# Patient Record
Sex: MALE | Race: WHITE
[De-identification: names, ages, dates, MRNs, and addresses within clinical notes are randomized per-mention and may not be internally consistent; named-entity substitution may affect disease eponyms.]

---

## 2019-11-29 ENCOUNTER — HOSPITAL ENCOUNTER (EMERGENCY)
Dept: HOSPITAL 97 - ER | Age: 64
Discharge: ANOTHER HOSPITAL | End: 2019-11-29
Payer: COMMERCIAL

## 2019-11-29 VITALS — SYSTOLIC BLOOD PRESSURE: 108 MMHG | DIASTOLIC BLOOD PRESSURE: 71 MMHG

## 2019-11-29 VITALS — TEMPERATURE: 98.2 F

## 2019-11-29 VITALS — OXYGEN SATURATION: 100 %

## 2019-11-29 DIAGNOSIS — I10: ICD-10-CM

## 2019-11-29 DIAGNOSIS — K74.60: ICD-10-CM

## 2019-11-29 DIAGNOSIS — R18.8: ICD-10-CM

## 2019-11-29 DIAGNOSIS — D64.9: ICD-10-CM

## 2019-11-29 DIAGNOSIS — C22.8: Primary | ICD-10-CM

## 2019-11-29 LAB
ALBUMIN SERPL BCP-MCNC: 1.7 G/DL (ref 3.4–5)
ALP SERPL-CCNC: 164 U/L (ref 45–117)
ALT SERPL W P-5'-P-CCNC: 17 U/L (ref 12–78)
AST SERPL W P-5'-P-CCNC: 26 U/L (ref 15–37)
BUN BLD-MCNC: 31 MG/DL (ref 7–18)
GLUCOSE SERPLBLD-MCNC: 190 MG/DL (ref 74–106)
HCT VFR BLD CALC: 17.3 % (ref 39.6–49)
INR BLD: 1.3
LIPASE SERPL-CCNC: 33 U/L (ref 73–393)
LYMPHOCYTES # SPEC AUTO: 0.2 K/UL (ref 0.7–4.9)
PMV BLD: 8.8 FL (ref 7.6–11.3)
POTASSIUM SERPL-SCNC: 4.6 MMOL/L (ref 3.5–5.1)
RBC # BLD: 1.93 M/UL (ref 4.33–5.43)

## 2019-11-29 PROCEDURE — 85610 PROTHROMBIN TIME: CPT

## 2019-11-29 PROCEDURE — 74022 RADEX COMPL AQT ABD SERIES: CPT

## 2019-11-29 PROCEDURE — 80048 BASIC METABOLIC PNL TOTAL CA: CPT

## 2019-11-29 PROCEDURE — 82140 ASSAY OF AMMONIA: CPT

## 2019-11-29 PROCEDURE — 36415 COLL VENOUS BLD VENIPUNCTURE: CPT

## 2019-11-29 PROCEDURE — 83690 ASSAY OF LIPASE: CPT

## 2019-11-29 PROCEDURE — 71045 X-RAY EXAM CHEST 1 VIEW: CPT

## 2019-11-29 PROCEDURE — 99285 EMERGENCY DEPT VISIT HI MDM: CPT

## 2019-11-29 PROCEDURE — 86850 RBC ANTIBODY SCREEN: CPT

## 2019-11-29 PROCEDURE — 30233N1 TRANSFUSION OF NONAUTOLOGOUS RED BLOOD CELLS INTO PERIPHERAL VEIN, PERCUTANEOUS APPROACH: ICD-10-PCS

## 2019-11-29 PROCEDURE — 80076 HEPATIC FUNCTION PANEL: CPT

## 2019-11-29 PROCEDURE — 86901 BLOOD TYPING SEROLOGIC RH(D): CPT

## 2019-11-29 PROCEDURE — 86900 BLOOD TYPING SEROLOGIC ABO: CPT

## 2019-11-29 PROCEDURE — 36430 TRANSFUSION BLD/BLD COMPNT: CPT

## 2019-11-29 PROCEDURE — 85025 COMPLETE CBC W/AUTO DIFF WBC: CPT

## 2019-11-29 PROCEDURE — 96374 THER/PROPH/DIAG INJ IV PUSH: CPT

## 2019-11-29 NOTE — RAD REPORT
EXAM DESCRIPTION:  Charito Single View11/29/2019 6:01 am

 

CLINICAL HISTORY:  sob

 

COMPARISON:  none

 

FINDINGS:   The lungs appear clear of acute infiltrate. The heart is normal size

 

IMPRESSION:   No acute abnormalities displayed

## 2019-11-29 NOTE — ER
Nurse's Notes                                                                                     

 [facility]                                                                 

Name: Ried Ortega                                                                               

Age: 64 yrs                                                                                       

Sex: Male                                                                                         

: 1955                                                                                   

MRN: [id number]                                                                                   

Arrival Date: 2019                                                                          

Time: 03:42                                                                                       

Account#: [id number]                                                                            

Bed 2                                                                                             

Private MD:                                                                                       

Diagnosis: Abdominal pain and distension. Gross ascites. Ca liver. Anemia                         

                                                                                                  

Presentation:                                                                                     

                                                                                             

03:40 Presenting complaint: EMS states: we were called out for the chief complaint swelling   rr5 

      of the abdomen (ascitic), having the discomfort that is getting worse. patient been a       

      known case of liver cirrhosis liver cancer.                                                 

03:40 Transition of care: patient was not received from another setting of care. Onset of     rr5 

      symptoms was 2019. Risk Assessment: Do you want to hurt yourself or            

      someone else? Patient reports no desire to harm self or others. Initial Sepsis Screen:      

      Does the patient meet any 2 criteria? No. Patient's initial sepsis screen is negative.      

      Does the patient have a suspected source of infection? No. Patient's initial sepsis         

      screen is negative. Note had been admitted to [facility] for paracentesis before. but        

      now does get an appointment. patient reported had an on and off diarrhea. Care prior to     

      arrival: Medication(s) given: reglan.                                                       

03:40 Method Of Arrival: EMS: [facility]                                                      rr5 

03:40 Acuity: KERI 3                                                                           rr5 

                                                                                                  

Historical:                                                                                       

- Allergies:                                                                                      

03:50 No Known Allergies;                                                                     rr5 

- Home Meds:                                                                                      

03:50 Nexavar oral oral [Active]; Lactulose Oral [Active]; doxazosin oral oral [Active];      rr5 

      Spironolactone Oral [Active]; enoxaparin subcutaneous subcutaneous [Active]; Zofran         

      Oral [Active]; carvedilol oral oral [Active]; amlodipine oral [Active]; Furosemide Oral     

      [Active]; tramadol Oral [Active]; Zinc Sulfate Oral [Active]; D3 [Active]; Miralax Oral     

      [Active]; Simethicone Oral [Active]; gabapentin oral oral [Active];                         

      Acetaminophen-Codeine Oral [Active]; Methocarbamol Oral [Active]; senocot [Active];         

- PMHx:                                                                                           

03:50 Hypertension; polycystic kidney disease; gallstones; liver cancer; lymph node cancer;   rr5 

      portal and spleenic blood clots;                                                            

- PSHx:                                                                                           

03:50 Appendectomy; removed varicose veins; Knee surgery; Hernia repair; leg repair for       rr5 

      bruising compartment syndrome;                                                              

                                                                                                  

- Immunization history:: Adult Immunizations up to date.                                          

- Social history:: Smoking status: Patient/guardian denies using tobacco,                         

  Patient/guardian denies using alcohol, street drugs.                                            

- Ebola Screening: : Patient negative for fever greater than or equal to 101.5 degrees            

  Fahrenheit, and additional compatible Ebola Virus Disease symptoms Patient denies               

  exposure to infectious person Patient denies travel to an Ebola-affected area in the            

  21 days before illness onset.                                                                   

                                                                                                  

                                                                                                  

Screenin:58 Abuse screen: Denies threats or abuse. Denies injuries from another. Nutritional        rr5 

      screening: No deficits noted. Tuberculosis screening: No symptoms or risk factors           

      identified. Fall Risk IV access (20 points). Gait- Impaired (20 pts.). Total Park Fall     

      Scale indicates Low Risk Score (25-44 pts). Fall prevention measures have been              

      instituted. Side Rails Up X 2 Placed close to Nursing Station Frequent Obs/Assesments       

      occuring Family Present and informed to notify staff if they need to leave bedside As       

      available Patient and Family Educated on Fall Prevention Program and strategies.            

                                                                                                  

Assessment:                                                                                       

03:30 General: Appears in no apparent distress. uncomfortable, Behavior is calm, cooperative, rr5 

      appropriate for age. Pain: Complains of pain in abdomen diffusely Pain radiates to          

      chest Pain currently is 5 out of 10 on a pain scale. Quality of pain is described as        

      aching, Pain began gradually, Is intermittent. Neuro: Level of Consciousness is awake,      

      alert, obeys commands, Oriented to person, place, time, situation, Appropriate for age.     

      Cardiovascular: Capillary refill < 3 seconds Patient's skin is warm and dry.                

      Respiratory: Reports shortness of breath Airway is patent Respiratory effort is even,       

      unlabored, Respiratory pattern is regular, symmetrical. GI: Abdomen is noted to have        

      ascites, Reports bloating, diarrhea. : No signs and/or symptoms were reported             

      regarding the genitourinary system. EENT: Eyes icteric sclera.                              

03:30 Derm: Skin is intact, Skin is pale, Skin temperature is warm. Musculoskeletal:          rr5 

      Capillary refill < 3 seconds, Swelling present in right leg.                                

04:15 Reassessment: Patient appears in no apparent distress at this time. No changes from     rr5 

      previously documented assessment. Patient and/or family updated on plan of care and         

      expected duration. Pain level reassessed. Patient is alert, oriented x 3, equal             

      unlabored respirations, skin warm/dry/pink. awaiting for results.                           

05:10 Reassessment: HGB of 5.5 ED provider aware with order made and carried out. for         rr5 

      transfusion 2 PRBC, consented request sent.                                                 

06:25 Reassessment: Patient appears in no apparent distress at this time. Patient is alert,   rr5 

      oriented x 3, equal unlabored respirations, skin warm/dry/pink. awaiting for the blood      

      product for transfusion. for transfer to [facility] still awaiting for the acceptance.       

      patient is resting eyes closed breathing spontaneously with oxygen support 2 liters via     

      nasal cannula. no complaints made.                                                          

06:52 Reassessment: transfer center of [facility] said to call back after shift change.  rr5 

06:59 Reassessment: blood transfusion started, checked by 2 nurses. primary nurse at bedside. jd3 

07:00 Reassessment: Patient appears in no apparent distress at this time. 1st unit of PRBC    rr5 

      started wristband [id number] unit number [id number] expires 2019. type a             

      positive. double check by 2 nurses.                                                         

07:08 Reassessment: Patient appears in no apparent distress at this time. pt received with      

      blood infusing as ordered, checked off with Ciro BARROS, transfusion continues at this       

      time, pt tolerating well, pt family at bedside, awaiting to call report to receiving        

      facility, pt to be transferred.                                                             

07:41 Reassessment: Patient appears in no apparent distress at this time. report called to    rachel Moses RN. She states that [facility] does not accept patients with active blood              

      transfusion, pt to be transfused then transferred to receiving facility, pt and pt          

      family updated.                                                                             

09:35 Reassessment: Patient appears in no apparent distress at this time. Patient and/or      sg  

      family updated on plan of care and expected duration. Pain level reassessed. Patient is     

      alert, oriented x 3, equal unlabored respirations, skin warm/dry/pink. 2nd unit PRBC        

      infusion completed at this time. Respiratory: Airway is patent Respiratory effort is        

      even, unlabored, Respiratory pattern is regular, symmetrical, Breath sounds are clear.      

09:35 Derm: Skin is intact, Skin is jaundiced, pale, Skin temperature is warm.                sg  

      Musculoskeletal: Capillary refill < 3 seconds, in bilateral fingers. toes. Swelling         

      present in right leg Reports pain in right leg.                                             

                                                                                                  

Vital Signs:                                                                                      

03:45  / 62; Pulse 72; Resp 19; Temp 98.4; Pulse Ox 100% ; Weight 113.4 kg; Height 6    rr5 

      ft. 3 in. (190.50 cm); Pain 5/10;                                                           

04:39  / 81; Pulse 75; Resp 18; Pulse Ox 99% on 2 lpm NC;                               rr5 

05:45  / 71; Pulse 70; Resp 17; Temp 97.5; Pulse Ox 99% on 2 lpm NC;                    rr5 

06:28  / 70; Pulse 76; Resp 18; Temp 97.5; Pulse Ox 99% on 2 lpm NC;                    rr5 

07:10  / 76; Pulse 88; Resp 18; Temp 97.5; Pulse Ox 99% on 2 lpm NC;                    sg  

07:25  / 81; Pulse 79; Resp 16; Temp 97.7; Pulse Ox 100% on 2 lpm NC;                   sg  

08:35  / 58; Pulse 73; Resp 16; Temp 98.2; Pulse Ox 100% on 2 lpm NC;                   sg  

09:05  / 71; Pulse 72; Resp 16; Temp 98.2; Pulse Ox 100% on 2 lpm NC; Pain 1/10;        sg  

09:40  / 70; Pulse 79; Resp 16; Temp 97.9; Pulse Ox 100% on 2 lpm NC; Pain 1/10;        sg  

03:45 Body Mass Index 31.25 (113.40 kg, 190.50 cm)                                            rr5 

                                                                                                  

ED Course:                                                                                        

03:20 Maintain EMS IV. Dressing intact. Good blood return noted. Site clean \T\ dry. Gauge \T\    rr
5

      site: G20 right forearm.                                                                    

03:42 Patient arrived in ED.                                                                  rr5 

03:47 Shiva Nunes MD is Attending Physician.                                                    pkl 

03:48 Triage completed.                                                                       rr5 

03:50 Arm band placed on right wrist.                                                         rr5 

04:28 Ciro Myers RN is Primary Nurse.                                                    rr5 

04:49 Patient has correct armband on for positive identification. Bed in low position. Call   rr5 

      light in reach. Side rails up X2. Pulse ox on. NIBP on.                                     

05:03 Notified ED physician of a critical lab result(s). Hgb of 5.5, Hct of 17.3. Dr Des hernandez  

      notified.                                                                                   

05:50 transfer initiated by [facility] with Deandre from the [facility].     eb  

06:24 connected Dr. Mckeon the hospitalist on call for [facility] with Dr. Nunes for patient eb  

      transfer consultation.                                                                      

06:29 administrative approval given by Triny Lemus  pending a bed. Dr. eli Iniguez has accepted the patient in transfer to [facility].                      

06:39 room assignment given by Triny Lemus. Pt is going to [facility]. Bed 759/      eb  

      report to be called to [phone].                                                        

06:55 Served as a chaperone during rectal exam.                                               rr5 

07:00 Report received from FIORELLA Tanner.                                                       sg  

07:07 Primary Nurse role handed off by Ciro Myers, FIORELLA                                     sg  

07:07 Vince Cuenca, RN is Primary Nurse.                                                       sg  

09:44 Patient transferred, IV remains in place. intact, No redness/swelling at site.          sg  

                                                                                                  

Administered Medications:                                                                         

07:53 Drug: fentaNYL (PF) 50 mcg Route: IVP; Site: right forearm;                             sg  

08:30 Follow up: Response: No adverse reaction; Pain is decreased; RASS: Drowsy (-1)            

                                                                                                  

                                                                                                  

Point of Care Testing:                                                                            

      Guaiac:                                                                                     

06:55 Stool Guaiac: Positive; Stool Hemoccult Control: Pass;                                  rr5 

Outcome:                                                                                          

06:39 ER care complete, transfer ordered by MD.                                               pkl 

09:44 Transferred by ground EMS to [facility], Transfer form completed. Note:     sv  

      Report given to Denise from  EMS                                                         

09:44 Condition: stable                                                                           

09:44 Instructed on the need for transfer.                                                        

09:57 Patient left the ED.                                                                    sg  

                                                                                                  

Signatures:                                                                                       

Kate Gonzalez RN RN sv Gay, Steven, RN                         Shiva Bryan MD MD pkl Ballard, Brenda, RN RN bb Davies, Jonathon, RN RN   jKristy Overton Raymond, RN                      RN   rr5                                                  

                                                                                                  

Corrections: (The following items were deleted from the chart)                                    

06:32 06:28  / 70; Pulse 76bpm; Resp 18bpm; Pulse Ox 99% 2 lpm Nasal Cannula; Temp      rr5 

      96.9F; rr5                                                                                  

                                                                                                  

**************************************************************************************************

## 2019-11-29 NOTE — EDPHYS
Physician Documentation                                                                           

 [facility]                                                                 

Name: Reid Ortega                                                                               

Age: 64 yrs                                                                                       

Sex: Male                                                                                         

: 1955                                                                                   

MRN: [id number]                                                                                   

Arrival Date: 2019                                                                          

Time: 03:42                                                                                       

Account#: [id number]                                                                            

Bed 2                                                                                             

Private MD:                                                                                       

ED Physician Shiva Nunes                                                                             

HPI:                                                                                              

                                                                                             

04:11 This 64 yrs old  Male presents to ER via EMS with complaints of abdominal      pkl 

      discomfort.                                                                                 

04:11 The patient presents with abdominal pain abdominal distention difficulty breathing.     pkl 

      Onset: The symptoms/episode began/occurred today. Patient has H/O liver cirrhosis and       

      cancer. Nad radiation therapy and now on chemotherapy.                                      

                                                                                                  

Historical:                                                                                       

- Allergies:                                                                                      

03:50 No Known Allergies;                                                                     rr5 

- Home Meds:                                                                                      

03:50 Nexavar oral oral [Active]; Lactulose Oral [Active]; doxazosin oral oral [Active];      rr5 

      Spironolactone Oral [Active]; enoxaparin subcutaneous subcutaneous [Active]; Zofran         

      Oral [Active]; carvedilol oral oral [Active]; amlodipine oral [Active]; Furosemide Oral     

      [Active]; tramadol Oral [Active]; Zinc Sulfate Oral [Active]; D3 [Active]; Miralax Oral     

      [Active]; Simethicone Oral [Active]; gabapentin oral oral [Active];                         

      Acetaminophen-Codeine Oral [Active]; Methocarbamol Oral [Active]; senocot [Active];         

- PMHx:                                                                                           

03:50 Hypertension; polycystic kidney disease; gallstones; liver cancer; lymph node cancer;   rr5 

      portal and spleenic blood clots;                                                            

- PSHx:                                                                                           

03:50 Appendectomy; removed varicose veins; Knee surgery; Hernia repair; leg repair for       rr5 

      bruising compartment syndrome;                                                              

                                                                                                  

- Immunization history:: Adult Immunizations up to date.                                          

- Social history:: Smoking status: Patient/guardian denies using tobacco,                         

  Patient/guardian denies using alcohol, street drugs.                                            

- Ebola Screening: : Patient negative for fever greater than or equal to 101.5 degrees            

  Fahrenheit, and additional compatible Ebola Virus Disease symptoms Patient denies               

  exposure to infectious person Patient denies travel to an Ebola-affected area in the            

  21 days before illness onset.                                                                   

                                                                                                  

                                                                                                  

ROS:                                                                                              

04:11 Eyes: Negative for injury, pain, redness, and discharge, ENT: Negative for injury,      pkl 

      pain, and discharge, Neck: Negative for injury, pain, and swelling, Cardiovascular:         

      Negative for chest pain, palpitations, and edema.                                           

04:11 Respiratory: Positive for shortness of breath, at rest.                                     

04:11 Abdomen/GI: Positive for abdominal pain, abdominal distension.                              

04:11 Back: Negative for acute changes.                                                           

04:11 : Negative for urinary symptoms.                                                          

04:11 MS/extremity: Positive for swelling, of the both  legs.                                     

04:11 Skin: Negative for rash.                                                                    

04:11 Neuro: Negative for altered mental status.                                                  

                                                                                                  

Exam:                                                                                             

04:11 Head/Face:  Normocephalic, atraumatic. Eyes:  Pupils equal round and reactive to light, pkl 

      extra-ocular motions intact.  Lids and lashes normal.  Conjunctiva and sclera are           

      non-icteric and not injected.  Cornea within normal limits.  Periorbital areas with no      

      swelling, redness, or edema. ENT:  Nares patent. No nasal discharge, no septal              

      abnormalities noted.  Tympanic membranes are normal and external auditory canals are        

      clear.  Oropharynx with no redness, swelling, or masses, exudates, or evidence of           

      obstruction, uvula midline.  Mucous membranes moist. Neck:  Trachea midline, no             

      thyromegaly or masses palpated, and no cervical lymphadenopathy.  Supple, full range of     

      motion without nuchal rigidity, or vertebral point tenderness.  No Meningismus.             

      Chest/axilla:  Normal chest wall appearance and motion.  Nontender with no deformity.       

      No lesions are appreciated. Cardiovascular:  Regular rate and rhythm with a normal S1       

      and S2.  No gallops, murmurs, or rubs.  Normal PMI, no JVD.  No pulse deficits.             

      Respiratory:  Lungs have equal breath sounds bilaterally, clear to auscultation and         

      percussion.  No rales, rhonchi or wheezes noted.  No increased work of breathing, no        

      retractions or nasal flaring.                                                               

04:11 Abdomen/GI: Inspection: distension, that is moderate.                                       

04:11 Back: Exam negative for acute changes.                                                      

04:11 : Exam negative for                                                                       

04:11 Musculoskeletal/extremity: Extremities: grossly normal except: noted in the both  legs:     

      swelling.                                                                                   

04:11 Skin: Exam negative for rash.                                                               

04:11 Neuro: Orientation: is normal, Mentation: is normal, Cranial nerves: grossly normal,        

      Motor: is normal.                                                                           

                                                                                                  

Vital Signs:                                                                                      

03:45  / 62; Pulse 72; Resp 19; Temp 98.4; Pulse Ox 100% ; Weight 113.4 kg; Height 6    rr5 

      ft. 3 in. (190.50 cm); Pain 5/10;                                                           

04:39  / 81; Pulse 75; Resp 18; Pulse Ox 99% on 2 lpm NC;                               rr5 

05:45  / 71; Pulse 70; Resp 17; Temp 97.5; Pulse Ox 99% on 2 lpm NC;                    rr5 

06:28  / 70; Pulse 76; Resp 18; Temp 97.5; Pulse Ox 99% on 2 lpm NC;                    rr5 

07:10  / 76; Pulse 88; Resp 18; Temp 97.5; Pulse Ox 99% on 2 lpm NC;                    sg  

07:25  / 81; Pulse 79; Resp 16; Temp 97.7; Pulse Ox 100% on 2 lpm NC;                   sg  

08:35  / 58; Pulse 73; Resp 16; Temp 98.2; Pulse Ox 100% on 2 lpm NC;                   sg  

09:05  / 71; Pulse 72; Resp 16; Temp 98.2; Pulse Ox 100% on 2 lpm NC; Pain 1/10;        sg  

09:40  / 70; Pulse 79; Resp 16; Temp 97.9; Pulse Ox 100% on 2 lpm NC; Pain /10;        sg  

03:45 Body Mass Index 31.25 (113.40 kg, 190.50 cm)                                            rr5 

                                                                                                  

MDM:                                                                                              

03:47 Patient medically screened.                                                             pkl 

06:35 Data reviewed: vital signs, nurses notes, lab test result(s), radiologic studies, plain pkl 

      films.                                                                                      

06:52 ED course: Talked to Dr. Mckeon. Transfer to [facility] under Dr. Iniguez service.  pkl 

                                                                                                  

                                                                                             

04:11 Order name: Basic Metabolic Panel                                                       pkl 

                                                                                             

04:11 Order name: CBC with Diff                                                               pkl 

                                                                                             

04:11 Order name: Creatinine for Radiology                                                    pkl 

                                                                                             

04:11 Order name: Hepatic Function                                                            pkl 

                                                                                             

04:11 Order name: Lipase                                                                      pkl 

                                                                                             

04:11 Order name: AMMONIA                                                                     pkl 

                                                                                             

04:55 Order name: Creatinine (Radiology Only); Complete Time: 05:06                           [facility]

                                                                                             

04:57 Order name: Basic Metabolic Panel; Complete Time: 05:06                                 [facility]

                                                                                             

04:57 Order name: Liver (Hepatic) Function; Complete Time: 05:06                              [facility]

                                                                                             

04:57 Order name: Lipase; Complete Time: 05:06                                                [facility]

                                                                                             

04:58 Order name: Ammonia; Complete Time: 05:06                                               [facility]

                                                                                             

05:01 Order name: CBC with Automated Diff; Complete Time: 05:06                               [facility]

                                                                                             

05:04 Order name: Type And Screen                                                               

                                                                                             

05:05 Order name: PRBC                                                                          

                                                                                             

04:11 Order name: IV Saline Lock; Complete Time: 04:28                                        [facility] 

                                                                                             

04:11 Order name: Labs collected and sent; Complete Time: 04:28                               [facility] 

                                                                                             

04:11 Order name: XRAY Abdomen Acute Series                                                   [facility] 

                                                                                             

05:05 Order name: Transfuse; Complete Time: 07:03                                               

                                                                                             

05:44 Order name: XRAY CXR (1 view)                                                           [facility] 

                                                                                             

05:59 Order name: ABO/RH no charge; Complete Time: 06:23                                      [facility]

                                                                                             

06:28 Order name: ABO/RH typing                                                               [facility]

                                                                                             

06:28 Order name: Antibody Screen                                                             [facility]

                                                                                             

06:38 Order name: PT-INR                                                                        

                                                                                             

07:09 Order name: Protime (+INR)                                                              [facility]

                                                                                             

08:51 Order name: RAD                                                                         [facility]

                                                                                             

09:05 Order name: RAD                                                                         [facility]

                                                                                                  

Administered Medications:                                                                         

07:53 Drug: fentaNYL (PF) 50 mcg Route: IVP; Site: right forearm;                             sg  

08:30 Follow up: Response: No adverse reaction; Pain is decreased; RASS: Drowsy (-1)          sg  

                                                                                                  

                                                                                                  

Point of Care Testing:                                                                            

      Guaiac:                                                                                     

06:55 Stool Guaiac: Positive; Stool Hemoccult Control: Pass;                                  rr5 

Disposition:                                                                                      

19 06:39 Transfer ordered to [facility]. Diagnosis is Abdominal pain    

  and distension. Gross ascites. Ca liver. Anemia.                                                

- Reason for transfer: Higher level of care.                                                      

- Accepting physician is Dr. Iniguez.                                                            

- Condition is Stable.                                                                            

- Problem is new.                                                                                 

- Symptoms are unchanged.                                                                         

                                                                                                  

                                                                                                  

                                                                                                  

Signatures:                                                                                       

Dispatcher [facility]                                                 

Vince Cuenca RN                         RN   sg                                                   

Shiva Nunes MD MD   pkJoselin Velásquez RN                     RN   david Myers, Ciro, RN                      RN   rr5                                                  

                                                                                                  

Corrections: (The following items were deleted from the chart)                                    

09:57 06:39 2019 06:39 Transfer ordered to [facility]. Diagnosis is sg  

      Abdominal pain and distension. Gross ascites. Ca liver. Anemia. Reason for transfer:        

      Higher level of care. Accepting physician is Dr. Iniguez. Condition is Stable. Problem     

      is new. Symptoms are unchanged. pkl                                                         

                                                                                                  

**************************************************************************************************

## 2019-11-29 NOTE — RAD REPORT
EXAM DESCRIPTION:

RAD - Abdomen Acute Series - 11/29/2019 5:27 am

 

CLINICAL HISTORY:  Abdominal pain

 

FINDINGS:  Examination is suboptimal secondary to a combination of technique and body habitus.

 

The bowel gas pattern appears unremarkable

 

Free air is not visualized

## 2019-12-06 ENCOUNTER — HOSPITAL ENCOUNTER (INPATIENT)
Dept: HOSPITAL 97 - 5TH | Age: 64
LOS: 18 days | Discharge: HOME | DRG: 432 | End: 2019-12-24
Attending: PSYCHIATRY & NEUROLOGY | Admitting: PSYCHIATRY & NEUROLOGY
Payer: COMMERCIAL

## 2019-12-06 VITALS — BODY MASS INDEX: 31.9 KG/M2

## 2019-12-06 DIAGNOSIS — R18.8: ICD-10-CM

## 2019-12-06 DIAGNOSIS — N18.9: ICD-10-CM

## 2019-12-06 DIAGNOSIS — F99: ICD-10-CM

## 2019-12-06 DIAGNOSIS — I48.92: ICD-10-CM

## 2019-12-06 DIAGNOSIS — I12.9: ICD-10-CM

## 2019-12-06 DIAGNOSIS — R01.1: ICD-10-CM

## 2019-12-06 DIAGNOSIS — Q61.3: ICD-10-CM

## 2019-12-06 DIAGNOSIS — G04.30: ICD-10-CM

## 2019-12-06 DIAGNOSIS — I49.9: ICD-10-CM

## 2019-12-06 DIAGNOSIS — I48.91: ICD-10-CM

## 2019-12-06 DIAGNOSIS — B19.20: ICD-10-CM

## 2019-12-06 DIAGNOSIS — R53.81: ICD-10-CM

## 2019-12-06 DIAGNOSIS — C22.0: ICD-10-CM

## 2019-12-06 DIAGNOSIS — K74.69: Primary | ICD-10-CM

## 2019-12-06 DIAGNOSIS — D64.89: ICD-10-CM

## 2019-12-06 PROCEDURE — 74018 RADEX ABDOMEN 1 VIEW: CPT

## 2019-12-06 PROCEDURE — 87088 URINE BACTERIA CULTURE: CPT

## 2019-12-06 PROCEDURE — 97161 PT EVAL LOW COMPLEX 20 MIN: CPT

## 2019-12-06 PROCEDURE — 97110 THERAPEUTIC EXERCISES: CPT

## 2019-12-06 PROCEDURE — 87086 URINE CULTURE/COLONY COUNT: CPT

## 2019-12-06 PROCEDURE — 97542 WHEELCHAIR MNGMENT TRAINING: CPT

## 2019-12-06 PROCEDURE — 97530 THERAPEUTIC ACTIVITIES: CPT

## 2019-12-06 PROCEDURE — 97116 GAIT TRAINING THERAPY: CPT

## 2019-12-06 PROCEDURE — 80048 BASIC METABOLIC PNL TOTAL CA: CPT

## 2019-12-06 PROCEDURE — 82140 ASSAY OF AMMONIA: CPT

## 2019-12-06 PROCEDURE — 81001 URINALYSIS AUTO W/SCOPE: CPT

## 2019-12-06 PROCEDURE — 36415 COLL VENOUS BLD VENIPUNCTURE: CPT

## 2019-12-06 PROCEDURE — 84134 ASSAY OF PREALBUMIN: CPT

## 2019-12-06 PROCEDURE — 85025 COMPLETE CBC W/AUTO DIFF WBC: CPT

## 2019-12-06 PROCEDURE — 83735 ASSAY OF MAGNESIUM: CPT

## 2019-12-06 PROCEDURE — 82040 ASSAY OF SERUM ALBUMIN: CPT

## 2019-12-11 RX ADMIN — OXYCODONE HYDROCHLORIDE PRN MG: 5 TABLET ORAL at 23:21

## 2019-12-11 NOTE — XMS REPORT
Summary of Care

 Created on:2019



Patient:Remington Ortega

Sex:Male

:1955

External Reference #:[id number]





Demographics







 Address   



   [place] [address]

 

 Home Phone  [phone]

 

 Mobile Phone  [phone]

 

 Email Address  [email or web address]

 

 Preferred Language  English

 

 Marital Status  

 

 Worship Affiliation  Unknown

 

 Race  White

 

 Ethnic Group  Not  or 









Author







 Organization  [facility]

 

 Address  [address] [place] [address]









Support







 Name  Relationship  Address  Phone

 

 Santa Delaney  Unavailable     [phone]



     [place] [address]  









Care Team Providers







 Name  Role  Phone

 

 Maykel Scott MD  Primary Care Provider  [phone]









Encounter Details







 Date  Type  Department  Care Team  Description

 

 2019  Orders Only  [facility]



  Doctor Unassigned, No  



     [address] [facility]



  Name



  



     [place] [address]  [address] [place] [address]  







Allergies

No Known Allergiesdocumented as of this encounter (statuses as of 2019)



Medications







 Medication  Sig  Dispensed  Refills  Start Date  End Date  Status

 

 ELIQUIS 5 mg tablet  TAKE 1 TABLET BY    1  2017    Active



   MOUTH TWICE A          



   DAY          

 

 HYDROcodone-acetaminophe  Take 1 tablet by  120 tablet  0  2017    Active



 n  mg tablet  mouth every 6          



   (six) hours as          



   needed for Pain          



   (scale 4-6).          

 

 furosemide 20 mg tablet  Take 1 tablet by  90 tablet  3  2018    Active



   mouth daily.          

 

 chlorthalidone 25 mg  Take 1 tablet by  90 tablet  3  2018    Active



 tablet  mouth daily.          

 

 methylPREDNISolone  Take  by mouth  21 Each  0  10/03/2018    Active



 (MEDROL, JANELLE,) 4 mg  SEE-INSTRUCTIONS          



 tabletsIndications:  . follow package          



 Acute gout due to renal  directions          



 impairment involving            



 left ankle            

 

 amLODIPine 10 mg tablet  Take 1 tablet by  90 tablet  3  2019    Active



   mouth daily.          

 

 olmesartan 40 mg tablet  Take 1 tablet by  30 tablet  5  2019    Active



   mouth daily.          

 

 pantoprazole 40 mg EC  Take 1 tablet by  30 tablet  5  2019    Active



 tabletIndications:  mouth daily.          



 Gastroesophageal reflux            



 disease, esophagitis            



 presence not specified            

 

 carvedilol 25 mg tablet  Take 2 tablets  120 tablet  1  2019    Active



   by mouth 2 (two)          



   times daily with          



   meals.          

 

 TRAMADOL 50 mg  TAKE ONE (1)  120 tablet  2  2019    Active



 tabletIndications: Pain  TABLET(S) BY          



   MOUTH EVERY SIX          



   HOURS AS NEEDED          



   FOR PAIN.          



documented as of this encounter (statuses as of 2019)



Active Problems







 Problem  Noted Date

 

 Chronic hepatitis C  2016

 

 Gout  2016

 

 Diabetes  2016

 

 Essential hypertension  2016



documented as of this encounter (statuses as of 2019)



Immunizations







 Name  Administration Dates  Next Due

 

 HEP B, Adult Dosage  2017, 2017, 2017  

 

 Influenza Virus Vaccine Quad ID [id number] [id number]  10/22/2015  

 

 Influenza Virus Vaccine Quad IM  2018  



 Multi-dose 6+ MO    

 

 Pneumococcal 13 Conjugate, PCV13 (Prevnar  2017  



 13)    

 

 Zoster(Zostavax)(Shingles)  2017  



documented as of this encounter



Social History







 Tobacco Use  Types  Packs/Day  Years Used  Date

 

 Never Smoker        









 Smokeless Tobacco: Never Used      









 Alcohol Use  Drinks/Week  oz/Week  Comments

 

 No      









 Sex Assigned at Birth  Date Recorded

 

 Not on file  









 Job Start Date  Occupation  Industry

 

 Not on file  Not on file  Not on file









 Travel History  Travel Start  Travel End









 No recent travel history available.



documented as of this encounter



Last Filed Vital Signs

Not on filedocumented in this encounter



Plan of Treatment







 Health Maintenance  Due Date  Last Done  Comments

 

 HEPATITIS C (HCV) SCREEN  1955    

 

 EYE EXAM  1966    

 

 LDL-C  1966    

 

 URINE MICROALBUMIN  1966    

 

 FOOT EXAM  1974    

 

 DTaP,Tdap,and Td Vaccines (1 - Tdap)  1975    

 

 COLONOSCOPY  2006    

 

 PNEUMOCOCCAL 0-64 YEARS COMBINED SERIES (1 of 1 -  2017  



 PPSV23)      

 

 Zoster Recombinant Vaccine (SHINGRIX) (2 of 3)  2017  

 

 HgA1C  2017  

 

 CREATININE (SERUM)  2018  

 

 INFLUENZA VACCINE (#1)  2019  



documented as of this encounter



Procedures







 Procedure Name  Priority  Date/Time  Associated Diagnosis  Comments

 

 NO SHOW OR MISSED  Routine  2019  8:40 AM    



 APPOINTMENT POLICY    CDT    



 ACKNOWLEDGEMENT        



documented in this encounter



Results

Not on filedocumented in this encounter



Insurance







 Payer  Benefit Plan  Subscriber ID  Effective  Phone  Address  Type



   / Group    Dates      

 

 AETNA -  AETNA  [id number]  2019-Prese    [address]  Medicare Adv



 MANAGED  MEDICARE ADV    nt    [id number]



  [place]IC[place]  



           [id number]  



documented as of this encounter

## 2019-12-11 NOTE — XMS REPORT
Summary of Care

 Created on:2019



Patient:Remington Ortega

Sex:Male

:1955

External Reference #:[id number]





Demographics







 Address   



   [place] [address]

 

 Home Phone  [phone]

 

 Mobile Phone  [phone]

 

 Email Address  [email or web address]

 

 Preferred Language  English

 

 Marital Status  

 

 Jainism Affiliation  Unknown

 

 Race  White

 

 Ethnic Group  Not  or 









Author







 Organization  [facility]

 

 Address  [address] [place] [address]









Support







 Name  Relationship  Address  Phone

 

 Santa Delaney  Unavailable     [phone]



     [place] [address]  









Care Team Providers







 Name  Role  Phone

 

 Maykel Scott MD  Primary Care Provider  [phone]









Reason for Visit







 Reason  Comments

 

 Follow-up  

 

 Diabetes Mellitus II  

 

 Hypertension  

 

 Refill Request  







Encounter Details







 Date  Type  Department  Care Team  Description

 

 2019  Office Visit  [facility]  Maykel Scott,  Essential 
hypertension (Primary Dx);



     Medicine - [facility]



  Type 2 diabetes mellitus with stage 3 chronic kidney disease, without long-
term current use of insulin;



     [address]  Malignant neoplasm of liver, unspecified 
liver malignancy type;



     Drive



  DRIVE



  Gastroesophageal reflux disease, esophagitis presence not specified;



     [place], [place]  Pain



     [id number] [phone]  



       (Fax)  







Allergies

No Known Allergiesdocumented as of this encounter (statuses as of 2019)



Medications







 Medication  Sig  Dispensed  Refills  Start  End Date  Status



         Date    

 

 HYDROcodone-acetaminop  Take 1 tablet  120 tablet  0      Active



 hen  mg tablet  by mouth every      7    



   6 (six) hours          



   as needed for          



   Pain (scale          



   4-6).          

 

 methylPREDNISolone  Take  by mouth  21 Each  0  10/03/201    Active



 (MEDROL, JANELLE,) 4 mg  SEE-INSTRUCTIO      8    



 tabletsIndications:  NS. follow          



 Acute gout due to  package          



 renal impairment  directions          



 involving left ankle            

 

 amLODIPine 10 mg  Take 1 tablet  90 tablet  3      Active



 tablet  by mouth      9    



   daily.          

 

 SORAfenib 200 mg  Take 1 tab by    0  20  Active



 tablet  mouth twice      9  20  



   daily for 2          



   weeks. Then 1          



   tab by mouth          



   three times          



   daily for 2          



   weeks.          

 

 traMADol 50 mg  TAKE ONE (1)  120 tablet  2      Active



 tabletIndications:  TABLET(S) BY      9    



 Pain  MOUTH EVERY          



   SIX HOURS AS          



   NEEDED FOR          



   PAIN.          

 

 pantoprazole 40 mg EC  Take 1 tablet  30 tablet  5      Active



 tabletIndications:  by mouth      9    



 Gastroesophageal  daily.          



 reflux disease,            



 esophagitis presence            



 not specified            

 

 olmesartan 40 mg  Take 1 tablet  30 tablet  5      Active



 tabletIndications:  by mouth      9    



 Essential hypertension  daily.          

 

 carvedilol 25 mg  Take 2 tablets  120 tablet  1      Active



 tabletIndications:  by mouth 2      9    



 Essential hypertension  (two) times          



   daily with          



   meals.          

 

 apixaban (ELIQUIS) 2.5  Take 1 tablet  60 tablet  2      Active



 mg tabletIndications:  by mouth 2      9    



 Essential hypertension  (two) times          



   daily.          

 

 ELIQUIS 5 mg tablet  TAKE 1 TABLET    1  20  Discontinued



   BY MOUTH TWICE      7  19  



   A DAY          

 

 furosemide 20 mg  Take 1 tablet  90 tablet  3  20  
Discontinued



 tablet  by mouth      8  19  



   daily.          

 

 chlorthalidone 25 mg  Take 1 tablet  90 tablet  3  20  
Discontinued



 tablet  by mouth      8  19  



   daily.          

 

 olmesartan 40 mg  Take 1 tablet  30 tablet  5  20  
Discontinued



 tablet  by mouth      9  19  



   daily.          

 

 pantoprazole 40 mg EC  Take 1 tablet  30 tablet  5  20  
Discontinued



 tabletIndications:  by mouth      9  19  



 Gastroesophageal  daily.          



 reflux disease,            



 esophagitis presence            



 not specified            

 

 carvedilol 25 mg  Take 2 tablets  120 tablet  1  20  
Discontinued



 tablet  by mouth 2      9  19  



   (two) times          



   daily with          



   meals.          

 

 TRAMADOL 50 mg  TAKE ONE (1)  120 tablet  2  20  Discontinued



 tabletIndications:  TABLET(S) BY      9  19  



 Pain  MOUTH EVERY          



   SIX HOURS AS          



   NEEDED FOR          



   PAIN.          



documented as of this encounter (statuses as of 2019)



Active Problems







 Problem  Noted Date

 

 Chronic hepatitis C  2016

 

 Gout  2016

 

 Diabetes  2016

 

 Essential hypertension  2016



documented as of this encounter (statuses as of 2019)



Immunizations







 Name  Administration Dates  Next Due

 

 HEP B, Adult Dosage  2017, 2017, 2017  

 

 Influenza Virus Vaccine Quad ID [id number] [id number]  10/22/2015  

 

 Influenza Virus Vaccine Quad IM  2018  



 Multi-dose 6+ MO    

 

 Pneumococcal 13 Conjugate, PCV13 (Prevnar  2017  



 13)    

 

 Zoster(Zostavax)(Shingles)  2017  



documented as of this encounter



Social History







 Tobacco Use  Types  Packs/Day  Years Used  Date

 

 Never Smoker        









 Smokeless Tobacco: Never Used      









 Alcohol Use  Drinks/Week  oz/Week  Comments

 

 No      









 Sex Assigned at Birth  Date Recorded

 

 Not on file  









 Job Start Date  Occupation  Industry

 

 Not on file  Not on file  Not on file









 Travel History  Travel Start  Travel End









 No recent travel history available.



documented as of this encounter



Last Filed Vital Signs







 Vital Sign  Reading  Time Taken  Comments

 

 Blood Pressure  130/90  2019  8:57 AM CDT  

 

 Pulse  -  -  

 

 Temperature  -  -  

 

 Respiratory Rate  -  -  

 

 Oxygen Saturation  -  -  

 

 Inhaled Oxygen Concentration  -  -  

 

 Weight  128.4 kg (283 lb)  2019  8:57 AM CDT  

 

 Height  -  -  

 

 Body Mass Index  34.45  2016  1:33 PM CST  



documented in this encounter



Progress Notes

Maykel Scott MD - 2019  8:30 AM CDTFormatting of this note might be 
different from the original.

Cc: HTN, DMII, Liver cancer

Chief Complaint

Patient presents with

 Follow-up

 Diabetes Mellitus II

 Hypertension

 Refill Request



Remington Ortega is a 64 year old male.

Here for f/u



Allergies

Remington has No Known Allergies.



Medications

Outpatient Medications Prior to Visit

Medication Sig Dispense Refill

 SORAfenib 200 mg tablet Take 1 tab by mouth twice daily for 2 weeks. Then 1 
tab by mouth three times daily for 2 weeks.

 TRAMADOL 50 mg tablet TAKE ONE (1) TABLET(S) BY MOUTH EVERY SIX HOURS AS 
NEEDED FOR PAIN. 120 tablet 2

 carvedilol 25 mg tablet Take 2 tablets by mouth 2 (two) times daily with 
meals. 120 tablet 1

 pantoprazole 40 mg EC tablet Take 1 tablet by mouth daily. 30 tablet 5

 amLODIPine 10 mg tablet Take 1 tablet by mouth daily. 90 tablet 3

 olmesartan 40 mg tablet Take 1 tablet by mouth daily. 30 tablet 5

 chlorthalidone 25 mg tablet Take 1 tablet by mouth daily. 90 tablet 3

 furosemide 20 mg tablet Take 1 tablet by mouth daily. 90 tablet 3

 HYDROcodone-acetaminophen  mg tablet Take 1 tablet by mouth every 6 (
six) hours as needed for Pain (scale 4-6). 120 tablet 0

 ELIQUIS 5 mg tablet TAKE 1 TABLET BY MOUTH TWICE A DAY  1

 methylPREDNISolone (MEDROL, JANELLE,) 4 mg tablets Take  by mouth SEE-
INSTRUCTIONS. follow package directions 21 Each 0



No facility-administered medications prior to visit.



Histories

Past Medical History:

Diagnosis Date

 Cancer of liver

 Diabetes mellitus

 Gout

 Hepatitis C

 Hepatitis C, chronic

 says that hep c is gone

 Hypertension

 Polycystic kidney disease



Past Surgical History:

Procedure Laterality Date

 HERNIA REPAIR



Social History



Socioeconomic History

 Marital status: 

  Spouse name: Not on file

 Number of children: Not on file

 Years of education: Not on file

 Highest education level: Not on file

Occupational History

 Not on file

Social Needs

 Financial resource strain: Not on file

 Food insecurity:

  Worry: Not on file

  Inability: Not on file

 Transportation needs:

  Medical: Not on file

  Non-medical: Not on file

Tobacco Use

 Smoking status: Never Smoker

 Smokeless tobacco: Never Used

Substance and Sexual Activity

 Alcohol use: No

 Drug use: No

 Sexual activity: Not on file

Lifestyle

 Physical activity:

  Days per week: Not on file

  Minutes per session: Not on file

 Stress: Not on file

Relationships

 Social connections:

  Talks on phone: Not on file

  Gets together: Not on file

  Attends Evangelical service: Not on file

  Active member of club or organization: Not on file

  Attends meetings of clubs or organizations: Not on file

  Relationship status: Not on file

 Intimate partner violence:

  Fear of current or ex partner: Not on file

  Emotionally abused: Not on file

  Physically abused: Not on file

  Forced sexual activity: Not on file

Other Topics Concern

 Not on file

Social History Narrative

 [place] 



Family History

Problem Relation Age of Onset

 Pulmonary Mother



Review of Systems



Vital Signs

/90  | Wt 283 lb (128.4 kg)  | BMI 34.45 kg/m



Physical Exam

Constitutional: He is oriented to person, place, and time. He appears well-
developed and well-nourished.

HENT:

Head: Normocephalic and atraumatic.

Right Ear: External ear normal.

Left Ear: External ear normal.

Eyes: Pupils are equal, round, and reactive to light. EOM are normal.

Cardiovascular: Normal rate, regular rhythm and normal heart sounds.

Pulmonary/Chest: Effort normal.

Abdominal: Soft.

Musculoskeletal: Normal range of motion.

Neurological: He is alert and oriented to person, place, and time.

Skin: Skin is warm.

Vitals reviewed.



Assessment/Plan

DMII, medication refill

HTN, medication refill

Pain, medication refill



This visit did not involve counseling and coordination that comprised more than 
50% of the visit time.Electronically signed by Maykel Scott MD at 2019  9:04 AM CDTdocumented in this encounter



Plan of Treatment







 Health Maintenance  Due Date  Last Done  Comments

 

 HEPATITIS C (HCV) SCREEN  1955    

 

 EYE EXAM  1966    

 

 LDL-C  1966    

 

 URINE MICROALBUMIN  1966    

 

 FOOT EXAM  1974    

 

 DTaP,Tdap,and Td Vaccines (1 - Tdap)  1975    

 

 COLONOSCOPY  2006    

 

 PNEUMOCOCCAL 0-64 YEARS COMBINED SERIES (1 of 1 -  2017  



 PPSV23)      

 

 Zoster Recombinant Vaccine (SHINGRIX) (2 of 3)  2017  

 

 HgA1C  2017  

 

 CREATININE (SERUM)  2018  

 

 INFLUENZA VACCINE (#1)  2019  



documented as of this encounter



Results

Not on filedocumented in this encounter



Visit Diagnoses







 Diagnosis

 

 Essential hypertension - Primary







 Unspecified essential hypertension

 

 Type 2 diabetes mellitus with stage 3 chronic kidney disease, without long-term



 current use of insulin

 

 Malignant neoplasm of liver, unspecified liver malignancy type

 

 Gastroesophageal reflux disease, esophagitis presence not specified

 

 Pain







 Generalized pain



documented in this encounter



Insurance







 Payer  Benefit Plan  Subscriber ID  Effective  Phone  Address  Type



   / Group    Dates      

 

 AETNA -  AETNA  [id number]  2019-Prese    [address]  Medicare Adv



 MANAGED  MEDICARE ADV    nt    [id number]



  PPO



 MEDICARE          [place]  



           [id number]  









 Guarantor Name  Account Type  Relation to  Date of Birth  Phone  Billing



     Patient      Address

 

 Remington Ortega  Personal/Family  Self  1955  [phone]  [address] 







         (Home)  [place]



           [address]



documented as of this encounter

## 2019-12-11 NOTE — XMS REPORT
Patient Summary Document

 Created on:2019



Patient:RAJ LUU

Sex:Male

:1955

External Reference #:[id number]





Demographics







 Address  [address]



   [place] [address]

 

 Home Phone  [phone]

 

 Preferred Language  Unknown

 

 Marital Status  Unknown

 

 Amish Affiliation  Unknown

 

 Race  Unknown

 

 Additional Race(s)  Unavailable

 

 Ethnic Group  Unknown









Author







 Organization  [facility]

 

 Address  [address] [place] [address] [address]. [address] [place] [address]

 

 Phone  [phone]









Care Team Providers







 Name  Role  Phone

 

 Unavailable  Unavailable  Unavailable









Problems

This patient has no known problems.



Allergies, Adverse Reactions, Alerts

This patient has no known allergies or adverse reactions.



Medications

This patient has no known medications.

## 2019-12-11 NOTE — R.PREADM
SCREENING DATE AND TIME



2019 10:51 (CST) 



ANTICIPATED REHAB ADMISSION DATE



2019 



REFERRING FACILITY



[facility] 



REFERRAL DATE AND TIME



2019 10:51 (CST) 



ACUTE ADMIT DATE



2019 





Previous Rehabilitation(s):





No.



ACUTE /DC PLANNER



Kendy Bassett 



REFERRING PHYSICIAN



DR WHYTE 



REHAB FACILITY



[facility] 



CLINICAL LIAISON



Bere Fowler 



PHYSICIAN REVIEWER



Dr. Fareed Nagel M.D. 



MR#



[id number] 



ACCT#



[id number] 



NAME



REMINGTON ORTEGA



ADDRESS



[address] [place] [address] 



PHONE



[phone] 



ZIP



[id number] 



DATE OF BIRTH



1955 



AGE



64 



SSN#



[id number] 



GENDER



male 



MARITAL STATUS



 



RACE



white 



ADMIT FROM



02 - [facility] 



PRE-HOSPITAL LIVING SETTING



01 - Home (private home/apt. board/care, assisted living, group home, transitional living) 



HOME TYPE AND DETAILS



Type of home: single family house

# of steps to enter the residence: 1

# of levels in the residence: 1

# of steps within the residence: 0



PRE-HOSPITAL LIVING WITH



Family/Relatives 



FAMILY SUPPORT



Yes 



PRIMARY FAMILY CONTACT NAME



Santa Delaney 



PRIMARY FAMILY CONTACT PHONE



[phone] 



PRIMARY FAMILY CONTACT RELATIONSHIP



Spouse 



IS PRIMARY FAMILY CONTACT AUTH. REP.?



no 



1ST EMERGENCY CONTACT



Santa Delaney 



1ST CONTACT PHONE



[phone] 



1ST CONTACT RELATIONSHIP



Spouse 



IS 1ST CONTACT AUTH. REP.?



no 



PHONE 2ND CONTACT ON ADM.?



no 



PATIENT EMPLOYMENT STATUS



Retired (for age) 



PATIENT EMPLOYER



No Employer 



PAYOR INFORMATION:



1ST PAYOR NAME



Gildardo 



1ST PAYOR POLICY ID



[id number] 



INJURY/ILLNESS DUE TO ACCIDENT?



No 



ANOTHER PARTY RESPONSIBLE?



No 



PRIMARY REHAB/ACUTE DIAGNOSIS:



Decompensated Liver Cirrhosis 



ONSET DATE



2019



REHAB IMPAIRMENT CATEGORY (MIRNA):



20 Miscellaneous (Misc) does NOT meet 60% rule



PRIMARY DIAGNOSIS-RELATED SURGERIES:



No surgeries related to the primary diagnosis were performed.



COMORBID REHAB/ACUTE DIAGNOSES:



- Non-Tiered

Cardiac arrhythmia, unspecified (I49.9) 

Ascites

Atrial fibrillation and flutter (I48) 

Chronic Kidney Disease

Edema

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 

Gallstone

Hepatocellular Carcinoma

Heart murmur

Hepatitis C

Hypertension

Jaundice

Mental health Disorder

Numbness

Polycystic Kidney Disease

Rectal Bleeding

Symptomatic anemia



INTERVENTIONS:



- Hypertension

Fluid management



Medications

VS



RISK FOR COMPLICATIONS:



- Hypertension

CVA

Hypotension

MI

TIA



SUMMARY OF ACUTE HOSPITALIZATION:



Pt. is a 65 yo Right-handed white male.

On 2019 he was admitted to [facility] [place] [facility] with diagnosis Decompensated Li
roger Cirrhosis .

His impairment category is Debility 16 -  Debility (16).

Pre-morbidly, Pt. was independent/mod-I in Transfers Control, Safety Awareness, Locomotion, Balance, 
Social Cognition, Sphincter Control, Self-Care, and Endurance; and he had good Safety Awareness.

Currently, he has deficits of Locomotion, Balance, Transfers Control, Self-Care, and Endurance.

Pt. is now referred to [facility] for acute in-patient rehabilitation in order
 to maximize patient's functional independence in activities of daily living, strength, ROM, and mobi
lity.

Patient has realistic goal of being discharged at assistance level 6-Victorina to reside at Home with  Fam
kayden/Relatives.

Remington Ortega

is a 64 year old male that lives with her wife in a 1 chi home. He was independent

with ADLs and self care. On 2019, patient has worsening abdominal

distention, pain and fatigue and having dark, melanotic stools and was admitted

to [facility] and treated.

He is now medically stable but in need of 24-hour nursing, doctor supervision

and oversite while receiving active and ongoing intensive



reasonably expected to participate in 3hours of therapy a day/15 hours per week

and receive care with an intensive interdisciplinary approach.



PAST MEDICAL HISTORY



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 

Ascites

Atrial fibrillation and flutter (I48) 

Cardiac arrhythmia, unspecified (I49.9) 

Chronic Kidney Disease

Edema

Gallstone

Heart murmur

Hepatitis C

Hepatocellular Carcinoma

Hypertension

Jaundice

Mental health Disorder

Numbness

Polycystic Kidney Disease

Rectal Bleeding

Symptomatic anemia



PAST SURGICAL HISTORY:



APPENDECTOMY

Cardiac Catheterization

EGD

Inguinal hernia repair

Cholecystectomy

Total knee bilateral replacement

US upper GI tract, endoscopic



MEDICATION ALLERGIES:



No Known Drug Allergies (NKDA)



ENVIRONMENTAL ALLERGIES:





Shellfish

- Substance Allergies

None Known

- Other Allergies

None Known



CODE STATUS:



Full code



WEIGHT/HEIGHT/BMI:



WEIGHT



252 lbs 



HEIGHT



6' 3" 



BMI



31.5 



DIET:



- Diet Type

Regular

- Diet - Solid Texture

Regular

- Diet - Liquid Texture

Regular

- Tube Feed

N/A



REVIEW OF SYSTEMS:



- Gen

Alert and awake

Lying in bed

No apparent distress

Oriented to: person, time, and place

- Vital Signs

Temperature: 97.7 F

SBP/DBP: 120/67

Pulse: 73

Resp: 19

Vital signs stable, afebrile

- CVS

RRR



VITAL SIGNS



Temperature: 97.7 F

SBP/DBP: 120/67

Pulse: 73

Resp: 19

Vital signs stable, afebrile



MEDICATIONS/TREATMENT:



Other-  See attached MAR (Medication Administration Record).



CURRENT SPHINCTER CONTROL:



Pre-hospital bladder status: continent

# of bladder accidents in the last 7 days prior to screenin

Pre-hospital bowel status: continent

# of bowel accidents in the last 7 days prior to screenin

Last Bowel Movement Date:



CURRENT LOCOMOTION STATUS:



distance walked 50  feet



DETAILED CURRENT FUNCTIONAL STATUS:



- Bladder

accident frequency: Ind - No accidents in the past 7 days

- Bowel

accident frequency: Ind - No accidents in the past 7 days

- Walking

score based on distance walked: 0(N/A)

- Wheelchair

score based on distance traveled: 0(N/A)



QI SCORES:



- Self-Care

A. Eating  04-Supervision or touching assistance  

B. Oral hygiene  04-Supervision or touching assistance  

C. Toileting hygiene  03-Partial/moderate assistance  

E. Shower/bathe self  03-Partial/moderate assistance  

F. Upper body dressing  03-Partial/moderate assistance  

G. Lower body dressing  03-Partial/moderate assistance  

H. Putting on/taking off footwear  03-Partial/moderate assistance  

- Mobility

A. Roll left and right  03-Partial/moderate assistance  

B. Sit to lying  03-Partial/moderate assistance  

C. Lying to sitting on side of bed  88-Not attempted due to medical condition or safety concerns  

D. Sit to stand  03-Partial/moderate assistance  

E. Chair/bed-to-chair transfer  03-Partial/moderate assistance  

F. Toilet transfer  03-Partial/moderate assistance  

G. Car transfer  88-Not attempted due to medical condition or safety concerns  

I. Walk 10 feet  03-Partial/moderate assistance  

J. Walk 50 feet with two turns  88-Not attempted due to medical condition or safety concerns  

K. Walk 150 feet  88-Not attempted due to medical condition or safety concerns  

L. Walking 10 feet on uneven surfaces  88-Not attempted due to medical condition or safety concerns  


M. 1 step (curb)  88-Not attempted due to medical condition or safety concerns  

N. 4 steps  88-Not attempted due to medical condition or safety concerns  

O. 12 steps  88-Not attempted due to medical condition or safety concerns  

P. Picking up object  88-Not attempted due to medical condition or safety concerns  

R. Wheel 50 feet with two turns  88-Not attempted due to medical condition or safety concerns  

S. Wheel 150 feet  88-Not attempted due to medical condition or safety concerns  

- Bladder and Bowel

Bladder continence  0-Always continent  

Bowel continence  0-Always continent  

- Endurance

Fair

- Balance

Fair

- Safety Awareness

Fair



CURRENT FUNC. DEFICITS:



Self-Care, Mobility, Endurance, Balance, and Safety Awareness



CURRENT / PREVIOUS ASSISTIVE DEVICES:



3-in-1 Commode

BSC

[facility]

Hospital Bed

Raised Toilet

Rolling Walker

Shower Chair

Tub Bench

Wheelchair



CURRENT USE ASSISTIVE DEVICES:



SCDs

TEDs



HISTORY OF FALLS. HAS THE PATIENT HAD TWO OR MORE FALLS IN THE PAST YEAR OR ANY FALL WITH INJURY IN T
HE PAST YEAR?:



No 



PRIOR SURGERY. DID THE PATIENT HAVE MAJOR SURGERY DURING  DAYS PRIOR TO ADMISSION?:



No 



THERAPY NOTES FROM ACUTE CARE:



Attached.



SPECIAL NEEDS:



- Safety Concerns

Skin breakdown precautions needed due to skin breakdown risk



PATIENT NEEDS ACTIVE AND ONGOING THERAPEUTIC INTERVENTION OF MULTIPLE THERAPY DISCIPLINES, INCLUDING:




- Dietary and Nutrition

Adequate Nutrition. Nutritional Education. Nutritional Supplements. 



PATIENT NEEDS CLOSE MEDICAL SUPERVISION BY A REHABILITATION PHYSICIAN FOR:



Coordination of Treatment Team

Medical and Co-Morbidity Management



PATIENT REQUIRES 24X7 REHAB NURSING FOR MEDICAL AND FUNCTIONAL MGT. OF THE FOLLOWING DEFICITS:



Disease Management

Medication Management

Patient/Family Education

Providing Safe Environment



PATIENT REQUIRES INTENSIVE, COORDINATED INTERDISCIPLINARY APPROACH TO REHAB:



Arranging Home Equipment/Services

Discharge Planning

Family Intervention/Training

/Case Management



PATIENT REHAB POTENTIAL:



AVA ORTEGA is able and expected to receive 3 hours of individualized therapy daily on at least 5 of e
very 7 days

AVA ORTEGA's prognosis for significant practical improvement within a reasonable period of time appea
rs Good

Expected level of measurable improvement will be of a practical value to AVA ORTEGA's functional capa
city or adaptations to impairments

Has a viable Discharge Plan

Medically appropriate; condition is sufficiently stable to participate in intensive rehab program



DISCHARGE PLAN:



- Estimated Length of Stay (days)

13. 



- Consensus on plan

Discharge plan has been discussed with primary caregiver. Patient/Family is in agreement with the jose
n. Primary caregiver is in agreement with the plan. 



- Patient/Family Goals

Return home independently. 



- Planned Living Setting Upon Discharge

Home, to live with Family/Relatives. Transitional Living. 



RECOMMENDED CARE LEVEL:



IRF



RECOMMENDATION DETAILS:



Recommended Admission to Comprehensive Rehabilitation Program to Increase Functional [place]



SCREENER'S COMPLETENESS CONFIRMATION:



- Screening Confirmation

The patient data collection on this preadmission screening form is finished



PHYSICIANS REVIEW AND ADMISSION DETERMINATION



Admit - Based on my review of the Pre-Admission Screening results, in my medical judgment and experie
nce, I concur with the findings and recommend admission to [facility], as this
 patient requires an IRF level of care.



SIGNATURE PANEL:



Clinical Liaison - [electronically] signed by Bere Allen on 2019 at 15:56 (CST)

Physician Reviewer - [electronically] signed by Dr. Fareed Nagel M.D. on 2019 at 16:03 (CST
)

## 2019-12-11 NOTE — XMS REPORT
Summary of Care

 Created on:2019



Patient:Remington Ortega

Sex:Male

:1955

External Reference #:[id number]





Demographics







 Address   



   [place] [address]

 

 Home Phone  [phone]

 

 Mobile Phone  [phone]

 

 Email Address  [email or web address]

 

 Preferred Language  English

 

 Marital Status  

 

 Restoration Affiliation  Unknown

 

 Race  White

 

 Ethnic Group  Not  or 









Author







 Organization  [facility]

 

 Address  [address] [place] [address]









Support







 Name  Relationship  Address  Phone

 

 Santa Delaney  Unavailable     [phone]



     [place] [address]  









Care Team Providers







 Name  Role  Phone

 

 Maykel Scott MD  Primary Care Provider  [phone]









Reason for Visit







 Reason  Comments

 

 Follow-up  

 

 Diabetes Mellitus II  

 

 Hypertension  

 

 Refill Request  







Encounter Details







 Date  Type  Department  Care Team  Description

 

 2019  Office Visit  [facility]  Maykel Scott,  Essential 
hypertension (Primary Dx);



     Medicine - [facility]



  Type 2 diabetes mellitus with stage 3 chronic kidney disease, without long-
term current use of insulin;



     [address]  Malignant neoplasm of liver, unspecified 
liver malignancy type;



     Drive



  DRIVE



  Gastroesophageal reflux disease, esophagitis presence not specified;



     [place], [place]  [place]



     [id number] [phone]  



       (Fax)  







Allergies

No Known Allergiesdocumented as of this encounter (statuses as of 2019)



Medications







 Medication  Sig  Dispensed  Refills  Start  End Date  Status



         Date    

 

 HYDROcodone-acetaminop  Take 1 tablet  120 tablet  0      Active



 hen  mg tablet  by mouth every      7    



   6 (six) hours          



   as needed for          



   Pain (scale          



   4-6).          

 

 methylPREDNISolone  Take  by mouth  21 Each  0  10/03/201    Active



 (MEDROL, JANELLE,) 4 mg  SEE-INSTRUCTIO      8    



 tabletsIndications:  NS. follow          



 Acute gout due to  package          



 renal impairment  directions          



 involving left ankle            

 

 amLODIPine 10 mg  Take 1 tablet  90 tablet  3      Active



 tablet  by mouth      9    



   daily.          

 

 SORAfenib 200 mg  Take 1 tab by    0  20  Active



 tablet  mouth twice      9  20  



   daily for 2          



   weeks. Then 1          



   tab by mouth          



   three times          



   daily for 2          



   weeks.          

 

 traMADol 50 mg  TAKE ONE (1)  120 tablet  2      Active



 tabletIndications:  TABLET(S) BY      9    



 Pain  MOUTH EVERY          



   SIX HOURS AS          



   NEEDED FOR          



   PAIN.          

 

 pantoprazole 40 mg EC  Take 1 tablet  30 tablet  5      Active



 tabletIndications:  by mouth      9    



 Gastroesophageal  daily.          



 reflux disease,            



 esophagitis presence            



 not specified            

 

 olmesartan 40 mg  Take 1 tablet  30 tablet  5      Active



 tabletIndications:  by mouth      9    



 Essential hypertension  daily.          

 

 carvedilol 25 mg  Take 2 tablets  120 tablet  1      Active



 tabletIndications:  by mouth 2      9    



 Essential hypertension  (two) times          



   daily with          



   meals.          

 

 apixaban (ELIQUIS) 2.5  Take 1 tablet  60 tablet  2      Active



 mg tabletIndications:  by mouth 2      9    



 Essential hypertension  (two) times          



   daily.          

 

 ELIQUIS 5 mg tablet  TAKE 1 TABLET    1  20  Discontinued



   BY MOUTH TWICE      7  19  



   A DAY          

 

 furosemide 20 mg  Take 1 tablet  90 tablet  3  20  
Discontinued



 tablet  by mouth      8  19  



   daily.          

 

 chlorthalidone 25 mg  Take 1 tablet  90 tablet  3  20  
Discontinued



 tablet  by mouth      8  19  



   daily.          

 

 olmesartan 40 mg  Take 1 tablet  30 tablet  5  20  
Discontinued



 tablet  by mouth      9  19  



   daily.          

 

 pantoprazole 40 mg EC  Take 1 tablet  30 tablet  5  20  
Discontinued



 tabletIndications:  by mouth      9  19  



 Gastroesophageal  daily.          



 reflux disease,            



 esophagitis presence            



 not specified            

 

 carvedilol 25 mg  Take 2 tablets  120 tablet  1  20  
Discontinued



 tablet  by mouth 2      9  19  



   (two) times          



   daily with          



   meals.          

 

 TRAMADOL 50 mg  TAKE ONE (1)  120 tablet  2  20  Discontinued



 tabletIndications:  TABLET(S) BY      9  19  



 Pain  MOUTH EVERY          



   SIX HOURS AS          



   NEEDED FOR          



   PAIN.          



documented as of this encounter (statuses as of 2019)



Active Problems







 Problem  Noted Date

 

 Chronic hepatitis C  2016

 

 Gout  2016

 

 Diabetes  2016

 

 Essential hypertension  2016



documented as of this encounter (statuses as of 2019)



Immunizations







 Name  Administration Dates  Next Due

 

 HEP B, Adult Dosage  2017, 2017, 2017  

 

 Influenza Virus Vaccine Quad ID [id number]  10/22/2015  

 

 Influenza Virus Vaccine Quad IM  2018  



 Multi-dose 6+ MO    

 

 Pneumococcal 13 Conjugate, PCV13 (Prevnar  2017  



 13)    

 

 Zoster(Zostavax)(Shingles)  2017  



documented as of this encounter



Social History







 Tobacco Use  Types  Packs/Day  Years Used  Date

 

 Never Smoker        









 Smokeless Tobacco: Never Used      









 Alcohol Use  Drinks/Week  oz/Week  Comments

 

 No      









 Sex Assigned at Birth  Date Recorded

 

 Not on file  









 Job Start Date  Occupation  Industry

 

 Not on file  Not on file  Not on file









 Travel History  Travel Start  Travel End









 No recent travel history available.



documented as of this encounter



Last Filed Vital Signs







 Vital Sign  Reading  Time Taken  Comments

 

 Blood Pressure  130/90  2019  8:57 AM CDT  

 

 Pulse  -  -  

 

 Temperature  -  -  

 

 Respiratory Rate  -  -  

 

 Oxygen Saturation  -  -  

 

 Inhaled Oxygen Concentration  -  -  

 

 Weight  128.4 kg (283 lb)  2019  8:57 AM CDT  

 

 Height  -  -  

 

 Body Mass Index  34.45  2016  1:33 PM CST  



documented in this encounter



Progress Notes

Maykel Scott MD - 2019  8:30 AM CDTFormatting of this note might be 
different from the original.

Cc: HTN, DMII, Liver cancer

Chief Complaint

Patient presents with

 Follow-up

 Diabetes Mellitus II

 Hypertension

 Refill Request



Remington Ortega is a 64 year old male.

Here for f/u



Allergies

Remington has No Known Allergies.



Medications

Outpatient Medications Prior to Visit

Medication Sig Dispense Refill

 SORAfenib 200 mg tablet Take 1 tab by mouth twice daily for 2 weeks. Then 1 
tab by mouth three times daily for 2 weeks.

 TRAMADOL 50 mg tablet TAKE ONE (1) TABLET(S) BY MOUTH EVERY SIX HOURS AS 
NEEDED FOR PAIN. 120 tablet 2

 carvedilol 25 mg tablet Take 2 tablets by mouth 2 (two) times daily with 
meals. 120 tablet 1

 pantoprazole 40 mg EC tablet Take 1 tablet by mouth daily. 30 tablet 5

 amLODIPine 10 mg tablet Take 1 tablet by mouth daily. 90 tablet 3

 olmesartan 40 mg tablet Take 1 tablet by mouth daily. 30 tablet 5

 chlorthalidone 25 mg tablet Take 1 tablet by mouth daily. 90 tablet 3

 furosemide 20 mg tablet Take 1 tablet by mouth daily. 90 tablet 3

 HYDROcodone-acetaminophen  mg tablet Take 1 tablet by mouth every 6 (
six) hours as needed for Pain (scale 4-6). 120 tablet 0

 ELIQUIS 5 mg tablet TAKE 1 TABLET BY MOUTH TWICE A DAY  1

 methylPREDNISolone (MEDROL, JANELLE,) 4 mg tablets Take  by mouth SEE-
INSTRUCTIONS. follow package directions 21 Each 0



No facility-administered medications prior to visit.



Histories

Past Medical History:

Diagnosis Date

 Cancer of liver

 Diabetes mellitus

 Gout

 Hepatitis C

 Hepatitis C, chronic

 says that hep c is gone

 Hypertension

 Polycystic kidney disease



Past Surgical History:

Procedure Laterality Date

 HERNIA REPAIR



Social History



Socioeconomic History

 Marital status: 

  Spouse name: Not on file

 Number of children: Not on file

 Years of education: Not on file

 Highest education level: Not on file

Occupational History

 Not on file

Social Needs

 Financial resource strain: Not on file

 Food insecurity:

  Worry: Not on file

  Inability: Not on file

 Transportation needs:

  Medical: Not on file

  Non-medical: Not on file

Tobacco Use

 Smoking status: Never Smoker

 Smokeless tobacco: Never Used

Substance and Sexual Activity

 Alcohol use: No

 Drug use: No

 Sexual activity: Not on file

Lifestyle

 Physical activity:

  Days per week: Not on file

  Minutes per session: Not on file

 Stress: Not on file

Relationships

 Social connections:

  Talks on phone: Not on file

  Gets together: Not on file

  Attends Holiness service: Not on file

  Active member of club or organization: Not on file

  Attends meetings of clubs or organizations: Not on file

  Relationship status: Not on file

 Intimate partner violence:

  Fear of current or ex partner: Not on file

  Emotionally abused: Not on file

  Physically abused: Not on file

  Forced sexual activity: Not on file

Other Topics Concern

 Not on file

Social History Narrative

 [place] 



Family History

Problem Relation Age of Onset

 Pulmonary Mother



Review of Systems



Vital Signs

/90  | Wt 283 lb (128.4 kg)  | BMI 34.45 kg/m



Physical Exam

Constitutional: He is oriented to person, place, and time. He appears well-
developed and well-nourished.

HENT:

Head: Normocephalic and atraumatic.

Right Ear: External ear normal.

Left Ear: External ear normal.

Eyes: Pupils are equal, round, and reactive to light. EOM are normal.

Cardiovascular: Normal rate, regular rhythm and normal heart sounds.

Pulmonary/Chest: Effort normal.

Abdominal: Soft.

Musculoskeletal: Normal range of motion.

Neurological: He is alert and oriented to person, place, and time.

Skin: Skin is warm.

Vitals reviewed.



Assessment/Plan

DMII, medication refill

HTN, medication refill

Pain, medication refill



This visit did not involve counseling and coordination that comprised more than 
50% of the visit time.Electronically signed by Maykel Scott MD at 2019  9:04 AM CDTdocumented in this encounter



Plan of Treatment







 Health Maintenance  Due Date  Last Done  Comments

 

 HEPATITIS C (HCV) SCREEN  1955    

 

 EYE EXAM  1966    

 

 LDL-C  1966    

 

 URINE MICROALBUMIN  1966    

 

 FOOT EXAM  1974    

 

 DTaP,Tdap,and Td Vaccines (1 - Tdap)  1975    

 

 COLONOSCOPY  2006    

 

 PNEUMOCOCCAL 0-64 YEARS COMBINED SERIES (1 of 1 -  2017  



 PPSV23)      

 

 Zoster Recombinant Vaccine (SHINGRIX) (2 of 3)  2017  

 

 HgA1C  2017  

 

 CREATININE (SERUM)  2018  

 

 INFLUENZA VACCINE (#1)  2019  



documented as of this encounter



Results

Not on filedocumented in this encounter



Visit Diagnoses







 Diagnosis

 

 Essential hypertension - Primary







 Unspecified essential hypertension

 

 Type 2 diabetes mellitus with stage 3 chronic kidney disease, without long-term



 current use of insulin

 

 Malignant neoplasm of liver, unspecified liver malignancy type

 

 Gastroesophageal reflux disease, esophagitis presence not specified

 

 Pain







 Generalized pain



documented in this encounter



Insurance







 Payer  Benefit Plan  Subscriber ID  Effective  Phone  Address  Type



   / Group    Dates      

 

 [employer] -  [employer]  [id number]  2019-Prese    [address]  Medicare Adv



 MANAGED  MEDICARE ADV    nt    [id number]



  PPO



 MEDICARE          [place]  



           [id number]  









 Guarantor Name  Account Type  Relation to  Date of Birth  Phone  Billing



     Patient      Address

 

 Remington Ortega  Personal/Family  Self  1955  [phone]  [address] 







         (Home)  [place]



           [id number]



documented as of this encounter

## 2019-12-11 NOTE — XMS REPORT
Summary of Care

 Created on:2019



Patient:Remington Ortega

Sex:Male

:1955

External Reference #:[id number]





Demographics







 Address   



   [place] [address]

 

 Home Phone  [phone]

 

 Mobile Phone  [phone]

 

 Email Address  [email or web address]

 

 Preferred Language  English

 

 Marital Status  

 

 Shinto Affiliation  Unknown

 

 Race  White

 

 Ethnic Group  Not  or 









Author







 Organization  [facility]

 

 Address  [address] [place] [id number]









Support







 Name  Relationship  Address  Phone

 

 Santa Delaney  Unavailable     [phone]



     [place] [address]  









Care Team Providers







 Name  Role  Phone

 

 Maykel Scott MD  Primary Care Provider  [phone]









Reason for Visit







 Reason  Comments

 

 Follow-up  

 

 Diabetes Mellitus II  

 

 Hypertension  

 

 Refill Request  







Encounter Details







 Date  Type  Department  Care Team  Description

 

 2019  Office Visit  [facility]  Maykel Scott,  Essential 
hypertension (Primary Dx);



     Medicine - [facility]



  Type 2 diabetes mellitus with stage 3 chronic kidney disease, without long-
term current use of insulin;



     [address]  Malignant neoplasm of liver, unspecified 
liver malignancy type;



     Drive



  DRIVE



  Gastroesophageal reflux disease, esophagitis presence not specified;



     [place], [place]  [place]



     [id number] [phone]  



       (Fax)  







Allergies

No Known Allergiesdocumented as of this encounter (statuses as of 2019)



Medications







 Medication  Sig  Dispensed  Refills  Start  End Date  Status



         Date    

 

 HYDROcodone-acetaminop  Take 1 tablet  120 tablet  0      Active



 hen  mg tablet  by mouth every      7    



   6 (six) hours          



   as needed for          



   Pain (scale          



   4-6).          

 

 methylPREDNISolone  Take  by mouth  21 Each  0  10/03/201    Active



 (MEDROL, JANELLE,) 4 mg  SEE-INSTRUCTIO      8    



 tabletsIndications:  NS. follow          



 Acute gout due to  package          



 renal impairment  directions          



 involving left ankle            

 

 amLODIPine 10 mg  Take 1 tablet  90 tablet  3      Active



 tablet  by mouth      9    



   daily.          

 

 SORAfenib 200 mg  Take 1 tab by    0  20  Active



 tablet  mouth twice      9  20  



   daily for 2          



   weeks. Then 1          



   tab by mouth          



   three times          



   daily for 2          



   weeks.          

 

 traMADol 50 mg  TAKE ONE (1)  120 tablet  2      Active



 tabletIndications:  TABLET(S) BY      9    



 Pain  MOUTH EVERY          



   SIX HOURS AS          



   NEEDED FOR          



   PAIN.          

 

 pantoprazole 40 mg EC  Take 1 tablet  30 tablet  5      Active



 tabletIndications:  by mouth      9    



 Gastroesophageal  daily.          



 reflux disease,            



 esophagitis presence            



 not specified            

 

 olmesartan 40 mg  Take 1 tablet  30 tablet  5      Active



 tabletIndications:  by mouth      9    



 Essential hypertension  daily.          

 

 carvedilol 25 mg  Take 2 tablets  120 tablet  1      Active



 tabletIndications:  by mouth 2      9    



 Essential hypertension  (two) times          



   daily with          



   meals.          

 

 apixaban (ELIQUIS) 2.5  Take 1 tablet  60 tablet  2      Active



 mg tabletIndications:  by mouth 2      9    



 Essential hypertension  (two) times          



   daily.          

 

 ELIQUIS 5 mg tablet  TAKE 1 TABLET    1  20  Discontinued



   BY MOUTH TWICE      7  19  



   A DAY          

 

 furosemide 20 mg  Take 1 tablet  90 tablet  3  20  
Discontinued



 tablet  by mouth      8  19  



   daily.          

 

 chlorthalidone 25 mg  Take 1 tablet  90 tablet  3  20  
Discontinued



 tablet  by mouth      8  19  



   daily.          

 

 olmesartan 40 mg  Take 1 tablet  30 tablet  5  20  
Discontinued



 tablet  by mouth      9  19  



   daily.          

 

 pantoprazole 40 mg EC  Take 1 tablet  30 tablet  5  20  
Discontinued



 tabletIndications:  by mouth      9  19  



 Gastroesophageal  daily.          



 reflux disease,            



 esophagitis presence            



 not specified            

 

 carvedilol 25 mg  Take 2 tablets  120 tablet  1  20  
Discontinued



 tablet  by mouth 2      9  19  



   (two) times          



   daily with          



   meals.          

 

 TRAMADOL 50 mg  TAKE ONE (1)  120 tablet  2  20  Discontinued



 tabletIndications:  TABLET(S) BY      9  19  



 Pain  MOUTH EVERY          



   SIX HOURS AS          



   NEEDED FOR          



   PAIN.          



documented as of this encounter (statuses as of 2019)



Active Problems







 Problem  Noted Date

 

 Chronic hepatitis C  2016

 

 Gout  2016

 

 Diabetes  2016

 

 Essential hypertension  2016



documented as of this encounter (statuses as of 2019)



Immunizations







 Name  Administration Dates  Next Due

 

 HEP B, Adult Dosage  2017, 2017, 2017  

 

 Influenza Virus Vaccine Quad ID [id number]  10/22/2015  

 

 Influenza Virus Vaccine Quad IM  2018  



 Multi-dose 6+ MO    

 

 Pneumococcal 13 Conjugate, PCV13 (Prevnar  2017  



 13)    

 

 Zoster(Zostavax)(Shingles)  2017  



documented as of this encounter



Social History







 Tobacco Use  Types  Packs/Day  Years Used  Date

 

 Never Smoker        









 Smokeless Tobacco: Never Used      









 Alcohol Use  Drinks/Week  oz/Week  Comments

 

 No      









 Sex Assigned at Birth  Date Recorded

 

 Not on file  









 Job Start Date  Occupation  Industry

 

 Not on file  Not on file  Not on file









 Travel History  Travel Start  Travel End









 No recent travel history available.



documented as of this encounter



Last Filed Vital Signs







 Vital Sign  Reading  Time Taken  Comments

 

 Blood Pressure  130/90  2019  8:57 AM CDT  

 

 Pulse  -  -  

 

 Temperature  -  -  

 

 Respiratory Rate  -  -  

 

 Oxygen Saturation  -  -  

 

 Inhaled Oxygen Concentration  -  -  

 

 Weight  128.4 kg (283 lb)  2019  8:57 AM CDT  

 

 Height  -  -  

 

 Body Mass Index  34.45  2016  1:33 PM CST  



documented in this encounter



Progress Notes

Maykel Scott MD - 2019  8:30 AM CDTFormatting of this note might be 
different from the original.

Cc: HTN, DMII, Liver cancer

Chief Complaint

Patient presents with

 Follow-up

 Diabetes Mellitus II

 Hypertension

 Refill Request



Remington Ortega is a 64 year old male.

Here for f/u



Allergies

Remington has No Known Allergies.



Medications

Outpatient Medications Prior to Visit

Medication Sig Dispense Refill

 SORAfenib 200 mg tablet Take 1 tab by mouth twice daily for 2 weeks. Then 1 
tab by mouth three times daily for 2 weeks.

 TRAMADOL 50 mg tablet TAKE ONE (1) TABLET(S) BY MOUTH EVERY SIX HOURS AS 
NEEDED FOR PAIN. 120 tablet 2

 carvedilol 25 mg tablet Take 2 tablets by mouth 2 (two) times daily with 
meals. 120 tablet 1

 pantoprazole 40 mg EC tablet Take 1 tablet by mouth daily. 30 tablet 5

 amLODIPine 10 mg tablet Take 1 tablet by mouth daily. 90 tablet 3

 olmesartan 40 mg tablet Take 1 tablet by mouth daily. 30 tablet 5

 chlorthalidone 25 mg tablet Take 1 tablet by mouth daily. 90 tablet 3

 furosemide 20 mg tablet Take 1 tablet by mouth daily. 90 tablet 3

 HYDROcodone-acetaminophen  mg tablet Take 1 tablet by mouth every 6 (
six) hours as needed for Pain (scale 4-6). 120 tablet 0

 ELIQUIS 5 mg tablet TAKE 1 TABLET BY MOUTH TWICE A DAY  1

 methylPREDNISolone (MEDROL, JANLELE,) 4 mg tablets Take  by mouth SEE-
INSTRUCTIONS. follow package directions 21 Each 0



No facility-administered medications prior to visit.



Histories

Past Medical History:

Diagnosis Date

 Cancer of liver

 Diabetes mellitus

 Gout

 Hepatitis C

 Hepatitis C, chronic

 says that hep c is gone

 Hypertension

 Polycystic kidney disease



Past Surgical History:

Procedure Laterality Date

 HERNIA REPAIR



Social History



Socioeconomic History

 Marital status: 

  Spouse name: Not on file

 Number of children: Not on file

 Years of education: Not on file

 Highest education level: Not on file

Occupational History

 Not on file

Social Needs

 Financial resource strain: Not on file

 Food insecurity:

  Worry: Not on file

  Inability: Not on file

 Transportation needs:

  Medical: Not on file

  Non-medical: Not on file

Tobacco Use

 Smoking status: Never Smoker

 Smokeless tobacco: Never Used

Substance and Sexual Activity

 Alcohol use: No

 Drug use: No

 Sexual activity: Not on file

Lifestyle

 Physical activity:

  Days per week: Not on file

  Minutes per session: Not on file

 Stress: Not on file

Relationships

 Social connections:

  Talks on phone: Not on file

  Gets together: Not on file

  Attends Jainism service: Not on file

  Active member of club or organization: Not on file

  Attends meetings of clubs or organizations: Not on file

  Relationship status: Not on file

 Intimate partner violence:

  Fear of current or ex partner: Not on file

  Emotionally abused: Not on file

  Physically abused: Not on file

  Forced sexual activity: Not on file

Other Topics Concern

 Not on file

Social History Narrative

 [place] 



Family History

Problem Relation Age of Onset

 Pulmonary Mother



Review of Systems



Vital Signs

/90  | Wt 283 lb (128.4 kg)  | BMI 34.45 kg/m



Physical Exam

Constitutional: He is oriented to person, place, and time. He appears well-
developed and well-nourished.

HENT:

Head: Normocephalic and atraumatic.

Right Ear: External ear normal.

Left Ear: External ear normal.

Eyes: Pupils are equal, round, and reactive to light. EOM are normal.

Cardiovascular: Normal rate, regular rhythm and normal heart sounds.

Pulmonary/Chest: Effort normal.

Abdominal: Soft.

Musculoskeletal: Normal range of motion.

Neurological: He is alert and oriented to person, place, and time.

Skin: Skin is warm.

Vitals reviewed.



Assessment/Plan

DMII, medication refill

HTN, medication refill

Pain, medication refill



This visit did not involve counseling and coordination that comprised more than 
50% of the visit time.Electronically signed by Maykel Scott MD at 2019  9:04 AM CDTdocumented in this encounter



Plan of Treatment







 Health Maintenance  Due Date  Last Done  Comments

 

 HEPATITIS C (HCV) SCREEN  1955    

 

 EYE EXAM  1966    

 

 LDL-C  1966    

 

 URINE MICROALBUMIN  1966    

 

 FOOT EXAM  1974    

 

 DTaP,Tdap,and Td Vaccines (1 - Tdap)  1975    

 

 COLONOSCOPY  2006    

 

 PNEUMOCOCCAL 0-64 YEARS COMBINED SERIES (1 of 1 -  2017  



 PPSV23)      

 

 Zoster Recombinant Vaccine (SHINGRIX) (2 of 3)  2017  

 

 HgA1C  2017  

 

 CREATININE (SERUM)  2018  

 

 INFLUENZA VACCINE (#1)  2019  



documented as of this encounter



Results

Not on filedocumented in this encounter



Visit Diagnoses







 Diagnosis

 

 Essential hypertension - Primary







 Unspecified essential hypertension

 

 Type 2 diabetes mellitus with stage 3 chronic kidney disease, without long-term



 current use of insulin

 

 Malignant neoplasm of liver, unspecified liver malignancy type

 

 Gastroesophageal reflux disease, esophagitis presence not specified

 

 Pain







 Generalized pain



documented in this encounter



Insurance







 Payer  Benefit Plan  Subscriber ID  Effective  Phone  Address  Type



   / Group    Dates      

 

 [employer] -  [employer]  [id number]  2019-Prese    [address]  Medicare Adv



 MANAGED  MEDICARE ADV    nt    [id number]



  PPO



 MEDICARE          [place]  



           [phone]  









 Guarantor Name  Account Type  Relation to  Date of Birth  Phone  Billing



     Patient      Address

 

 Remington Ortega  Personal/Family  Self  1955  [phone]  [address] 







         (Home)  [place]



           [id number]



documented as of this encounter

## 2019-12-12 LAB
ALBUMIN SERPL BCP-MCNC: 3.3 G/DL (ref 3.4–5)
ANISOCYTOSIS BLD QL: (no result)
BLD SMEAR INTERP: (no result)
BUN BLD-MCNC: 17 MG/DL (ref 7–18)
GLUCOSE SERPLBLD-MCNC: 144 MG/DL (ref 74–106)
HCT VFR BLD CALC: 24.9 % (ref 39.6–49)
LYMPHOCYTES # SPEC AUTO: 0.1 K/UL (ref 0.7–4.9)
MAGNESIUM SERPL-MCNC: 1.6 MG/DL (ref 1.8–2.4)
MORPHOLOGY BLD-IMP: (no result)
PMV BLD: 9 FL (ref 7.6–11.3)
POIKILOCYTOSIS BLD QL SMEAR: (no result)
POTASSIUM SERPL-SCNC: 4 MMOL/L (ref 3.5–5.1)
PREALB SERPL-MCNC: 6.2 MG/DL (ref 20–40)
RBC # BLD: 2.74 M/UL (ref 4.33–5.43)
UA COMPLETE W REFLEX CULTURE PNL UR: (no result)
URINE UROTHELIAL CELLS: <5 /HPF

## 2019-12-12 RX ADMIN — PANTOPRAZOLE SODIUM SCH MG: 40 TABLET, DELAYED RELEASE ORAL at 08:51

## 2019-12-12 RX ADMIN — PANTOPRAZOLE SODIUM SCH MG: 40 TABLET, DELAYED RELEASE ORAL at 17:23

## 2019-12-12 RX ADMIN — ONDANSETRON PRN MG: 4 TABLET, ORALLY DISINTEGRATING ORAL at 13:11

## 2019-12-12 RX ADMIN — ONDANSETRON PRN MG: 4 TABLET, ORALLY DISINTEGRATING ORAL at 01:42

## 2019-12-12 RX ADMIN — TRAMADOL HYDROCHLORIDE PRN MG: 50 TABLET, COATED ORAL at 13:11

## 2019-12-12 RX ADMIN — FUROSEMIDE SCH MG: 20 TABLET ORAL at 08:51

## 2019-12-12 RX ADMIN — MELATONIN PRN MG: 3 TAB ORAL at 01:41

## 2019-12-12 RX ADMIN — Medication SCH ML: at 20:02

## 2019-12-12 RX ADMIN — SPIRONOLACTONE SCH MG: 25 TABLET, FILM COATED ORAL at 08:52

## 2019-12-12 RX ADMIN — SPIRONOLACTONE SCH MG: 25 TABLET, FILM COATED ORAL at 17:23

## 2019-12-12 RX ADMIN — OXYCODONE HYDROCHLORIDE SCH MG: 20 TABLET, FILM COATED, EXTENDED RELEASE ORAL at 09:46

## 2019-12-12 RX ADMIN — MAGNESIUM OXIDE TAB 400 MG (241.3 MG ELEMENTAL MG) SCH MG: 400 (241.3 MG) TAB at 19:40

## 2019-12-12 RX ADMIN — LACTULOSE SCH GM: 20 SOLUTION ORAL at 08:51

## 2019-12-12 RX ADMIN — LACTULOSE SCH GM: 20 SOLUTION ORAL at 19:40

## 2019-12-12 RX ADMIN — OXYCODONE HYDROCHLORIDE SCH: 20 TABLET, FILM COATED, EXTENDED RELEASE ORAL at 20:00

## 2019-12-12 RX ADMIN — GABAPENTIN SCH: 300 CAPSULE ORAL at 20:00

## 2019-12-12 RX ADMIN — Medication SCH ML: at 19:39

## 2019-12-12 RX ADMIN — CEFEPIME SCH MLS: 1 INJECTION, POWDER, FOR SOLUTION INTRAMUSCULAR; INTRAVENOUS at 19:40

## 2019-12-12 RX ADMIN — Medication SCH ML: at 08:53

## 2019-12-12 RX ADMIN — CEFEPIME SCH MLS: 1 INJECTION, POWDER, FOR SOLUTION INTRAMUSCULAR; INTRAVENOUS at 08:53

## 2019-12-12 RX ADMIN — FUROSEMIDE SCH MG: 20 TABLET ORAL at 17:23

## 2019-12-12 RX ADMIN — FOLIC ACID-PYRIDOXINE-CYANOCOBALAMIN TAB 2.5-25-2 MG SCH TAB: 2.5-25-2 TAB at 08:56

## 2019-12-12 RX ADMIN — OXYCODONE HYDROCHLORIDE PRN MG: 5 TABLET ORAL at 08:56

## 2019-12-12 RX ADMIN — MAGNESIUM OXIDE TAB 400 MG (241.3 MG ELEMENTAL MG) SCH MG: 400 (241.3 MG) TAB at 08:51

## 2019-12-12 RX ADMIN — TRAMADOL HYDROCHLORIDE PRN MG: 50 TABLET, COATED ORAL at 01:41

## 2019-12-12 NOTE — R.HP
FACILITY:



[facility]



ENCOUNTER DATE AND TIME:



12/12/2019 16:08 (CST)



MR#:



[id number]



ACCT#:



[id number]



NAME



REMINGTON ORTEGA



ADDRESS:



[address] [place] [address] RO[address] [facility]



CITY:



[place]



ZIP



[address]



PHONE:



[phone]



YOB: 1955



AGE:



64



SSN#



[id number]



GENDER:



Male



DEXTERITY



Right-handed



MARITAL STATUS







RACE



White



PRE-HOSPITAL LIVING SETTING



01 - Home (private home/apt. board/care, assisted living, group home, transitional living) 



PRE-HOSPITAL LIVING WITH



Family/Relatives 



ENCOUNTER PHYSICIAN:



Dr. Fareed Nagel M.D.



REFERRING DOCTOR:



DR WHYTE



DATE OF ADMISSION:



12/11/2019 22:04 (CST)



REFERRING FACILITY



[facility] 



HOME TYPE AND DETAILS:



Type of home: single family house

# of steps to enter the residence: 1

# of levels in the residence: 1

# of steps within the residence: 0



ADMISSION DIAGNOSIS:



Decompensated Liver Cirrhosis 



ONSET DATE:



11/29/2019



PRIMARY DIAGNOSIS-RELATED SURGERIES:



No surgeries related to the primary diagnosis were performed.



SECONDARY/COMORBID DIAGNOSES (TIERED):



- Non-Tiered

Cardiac arrhythmia, unspecified (I49.9) 

Ascites

Atrial fibrillation and flutter (I48) 

Chronic Kidney Disease

Edema

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 

Gallstone

Hepatocellular Carcinoma

Heart murmur

Hepatitis C

Hypertension

Jaundice

Mental health Disorder

Numbness

Polycystic Kidney Disease

Rectal Bleeding

Symptomatic anemia



HISTORY OF PRESENT ILLNESS (HPI):



Pt. is a 63 yo Right-handed white male.

On 11/29/2019 he was admitted to [facility] with diagnosis Decompensated Li
roger Cirrhosis .

His impairment category is Debility 16 -  Debility (16).

Pre-morbidly, Pt. was independent/mod-I in Transfers Control, Safety Awareness, Locomotion, Balance, 
Social Cognition, Sphincter Control, Self-Care, and Endurance; and he had good Safety Awareness.

Currently, he has deficits of Locomotion, Balance, Transfers Control, Self-Care, and Endurance.

Pt. is now referred to [facility] for acute in-patient rehabilitation in order
 to maximize patient's functional independence in activities of daily living, strength, ROM, and mobi
lity.

Patient has realistic goal of being discharged at assistance level 6-Victorina to reside at Home with  Fam
kayden/Relatives.

Remington Ortega

is a 64 year old male that lives with her wife in a 1 chi home. He was independent

with ADLs and self care. On 11/29/2019, patient has worsening abdominal

distention, pain and fatigue and having dark, melanotic stools and was admitted

to [facility] and treated.

He is now medically stable but in need of 24-hour nursing, doctor supervision

and oversite while receiving active and ongoing intensive



reasonably expected to participate in 3hours of therapy a day/15 hours per week

and receive care with an intensive interdisciplinary approach.



MEDICATION ALLERGIES:



No Known Drug Allergies (NKDA)



ENVIRONMENTAL ALLERGIES:





Shellfish

- Substance Allergies

None Known

- Other Allergies

None Known



PAST MEDICAL HISTORY:



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 

Ascites

Atrial fibrillation and flutter (I48) 

Cardiac arrhythmia, unspecified (I49.9) 

Chronic Kidney Disease

Edema

Gallstone

Heart murmur

Hepatitis C

Hepatocellular Carcinoma

Hypertension

Jaundice

Mental health Disorder

Numbness

Polycystic Kidney Disease

Rectal Bleeding

Symptomatic anemia



PAST SURGICAL HISTORY:



APPENDECTOMY

Cardiac Catheterization

EGD

Inguinal hernia repair

Cholecystectomy

Total knee bilateral replacement

US upper GI tract, endoscopic



FAMILY HISTORY:



Family history is not contributory.



SOCIAL HISTORY:



- Home Living

Family/Relatives



REVIEW OF SYSTEMS:



- Gen

No Chills

No Fatigue

Fever

- Eyes

No Double Vision

No itchiness

- ENMT

No Difficulty Swallowing

- CVS

No Chest Discomfort

No Chest Pain

Fatigue

No Weight Gain

- Resp

No Cough

No Shortness of Breath

- GI

Continent

No Abdominal Pain

No Constipation

No Diarrhea

- 

Continent

No Kidney Pain

No Painful Urination

No Urinary Urgency

- MSK

Joint Pain

No Muscle Cramps

Stiffness

- Skin

No Itching

No Rash

No Suspicious Lesions

- Neuro

Coordination Difficulty

No Difficulty with Concentration

No Memory Loss

No Seizures

Weakness

- Psych

No Anxiety

No Depression



No HIV Exposure

No Persistent Infections

No Seasonal Allergies

- Endo

No Cold/Heat Intolerance

No Excessive Hunger

No Excessive Thirst

No Excessive Urination



PHYSICAL EXAM



- Gen

Alert and awake

Lying in bed

No apparent distress

Oriented to: person, time, and place

- Skin

No skin breakdown.



Normacephalic

- Eyes

No abnormalities

- ENMT

No abnormalities

- Neck

No abnormalities



No cervical adenopathy

- CVS

RRR

- Chest

No abnormalities

- Resp

Clear to auscultation

- Abd

Soft

- GI

Non distended



Deferred

- 

No abnormalities

- Ext

Mild bilateral lower extremity edema.

- MSK

4+/5 weakness in both lower extremities.

- Neuro

No focal deficits

- Psych

No abnormalities



VITAL SIGNS



Temperature: 97.7 F

SBP/DBP: 120/67

Pulse: 73

Resp: 19



NURSING:



- Shower

allowing shower



ACTIVITIES



OOB only with supervision



QI SCORES:



- Self-Care

A. Eating  04-Supervision or touching assistance  

B. Oral hygiene  04-Supervision or touching assistance  

C. Toileting hygiene  03-Partial/moderate assistance  

E. Shower/bathe self  03-Partial/moderate assistance  

F. Upper body dressing  03-Partial/moderate assistance  

G. Lower body dressing  03-Partial/moderate assistance  

H. Putting on/taking off footwear  03-Partial/moderate assistance  

- Mobility

A. Roll left and right  03-Partial/moderate assistance  

B. Sit to lying  03-Partial/moderate assistance  

C. Lying to sitting on side of bed  88-Not attempted due to medical condition or safety concerns  

D. Sit to stand  03-Partial/moderate assistance  

E. Chair/bed-to-chair transfer  03-Partial/moderate assistance  

F. Toilet transfer  03-Partial/moderate assistance  

G. Car transfer  88-Not attempted due to medical condition or safety concerns  

I. Walk 10 feet  03-Partial/moderate assistance  

J. Walk 50 feet with two turns  88-Not attempted due to medical condition or safety concerns  

K. Walk 150 feet  88-Not attempted due to medical condition or safety concerns  

L. Walking 10 feet on uneven surfaces  88-Not attempted due to medical condition or safety concerns  


M. 1 step (curb)  88-Not attempted due to medical condition or safety concerns  

N. 4 steps  88-Not attempted due to medical condition or safety concerns  

O. 12 steps  88-Not attempted due to medical condition or safety concerns  

P. Picking up object  88-Not attempted due to medical condition or safety concerns  

R. Wheel 50 feet with two turns  88-Not attempted due to medical condition or safety concerns  

S. Wheel 150 feet  88-Not attempted due to medical condition or safety concerns  

- Bladder and Bowel

Bladder continence  0-Always continent  

Bowel continence  0-Always continent  

- Endurance

Fair

- Balance

Fair

- Safety Awareness

Fair



CURRENT FUNC. DEFICITS:



Self-Care, Mobility, Endurance, Balance, and Safety Awareness



MEDICATIONS:



- Other

See attached MAR (Medication Administration Record)



ASSESSMENT:



Pt. is a 63 yo Right-handed white male.On 11/29/2019 he was admitted to [facility] with diagnosis Decompensated Liver Cirrhosis .His impairment category is Debility 16 -  Debil
ity (16).Pre-morbidly, Pt. was independent/mod-I in Transfers Control, Safety Awareness, Locomotion, 
Balance, Social Cognition, Sphincter Control, Self-Care, and Endurance; and he had good Safety Awaren
ess.Currently, he has deficits of Locomotion, Balance, Transfers Control, Self-Care, and Endurance.Pt
. is now referred to [facility] for acute in-patient rehabilitation in order t
o maximize patient's functional independence in activities of daily living, strength, ROM, and mobili
ty.- Rehab Goal

Patient has realistic goal of being discharged at assistance level 6-Victorina to reside at Home with  Fam
kayden/Relatives.

Remington Ortega

is a 64 year old male that lives with her wife in a 1 chi home. He was independent

with ADLs and self care. On 11/29/2019, patient has worsening abdominal

distention, pain and fatigue and having dark, melanotic stools and was admitted

to [facility] and treated.

He is now medically stable but in need of 24-hour nursing, doctor supervision

and oversite while receiving active and ongoing intensive



reasonably expected to participate in 3hours of therapy a day/15 hours per week

and receive care with an intensive interdisciplinary approach.REHAB PLAN:



- Physical Therapy

Gait dysfunction - to improve, our physical therapists will perform initial evaluation of pt's status
 upon admission and devise an individualized program for Gait Training, and Wheel Chair mobility

Inability to transfer - to improve, our physical therapists will perform initial evaluation of pt's s
tatus upon admission and devise an individualized program for Bed mobility

Need for home safety evaluation - to improve, our physical therapists will perform initial evaluation
 of pt's status upon admission and devise an individualized program for Home Evaluation

Need in caregiver upon discharge - to improve, our physical therapists will perform initial evaluatio
n of pt's status upon admission and devise an individualized program for Caregiver Training

New precaution - to improve, our physical therapists will perform initial evaluation of pt's status u
barb admission and devise an individualized program for Patient precaution education

 Edema - to improve, our physical therapists will perform initial evaluation of pt's status upon admi
ssion and devise an individualized program for Elevation Training, and Lymphedema Therapy

Poor balance - to improve, our physical therapists will perform initial evaluation of pt's status upo
n admission and devise an individualized program for Balance Training

Poor endurance - to improve, our physical therapists will perform initial evaluation of pt's status u
barb admission and devise an individualized program for Endurance Training

Weakness - to improve, our physical therapists will perform initial evaluation of pt's status upon ad
mission and devise an individualized program for Aquatic Therapy, Neuromuscular Reeducation, and Stre
ngthening

 Achieving independence - to improve, our physical therapists will perform initial evaluation of pt's
 status upon admission and devise an individualized program for Community Reintegration Activities

- Occupational Therapy

ADL deficits - to improve, our occupation therapists will perform initial evaluation of pt's status u
barb admission and devise an individualized program for Bathing, Bed mobility, Community Reintegration
, Cooking, Dressing, Eating, Fine Motor Skills, Grooming, Homemaking, Kitchen Mobility, Laundry, Candi
ent Education, Safety Awareness, Splinting - Positioning, Transfers(Toilet, Tub, Shower), and Wheel C
hair Management

Need for care giver - to improve, our occupation therapists will perform initial evaluation of pt's s
tatus upon admission and devise an individualized program for Caregiver Training

Weakness - to improve, our occupation therapists will perform initial evaluation of pt's status upon 
admission and devise an individualized program for Aquatic Therapy, Balance, Endurance, UE ROM, and U
E strengthening



MEDICAL PLAN:



- Diet Type

Start Regular

- Diet - Liquid Texture

Start Regular

- Tube Feed

Start N/A

- Other

See attached MAR (Medication Administration Record)

- Diet - Solid Texture

Regular

- Shower

 shower



DISCHARGE PLAN:



- Estimated Length of Stay (days)

13. 



- Consensus on plan

Discharge plan has been discussed with primary caregiver. Patient/Family is in agreement with the jose
n. Primary caregiver is in agreement with the plan. 



- Patient/Family Goals

Return home independently. 



- Planned Living Setting Upon Discharge

Home, to live with Family/Relatives. Transitional Living. 



SIGNATURE PANEL:



Electronically signed by Dr. Fareed Nagel M.D. on 12/12/2019 at 16:25 (CST)

## 2019-12-12 NOTE — FAST
ENCOUNTER DATE AND TIME:



12/12/2019 08:00 (CST)



NAME



JONE LUU



YOB: 1955



DATE OF ADMISSION:



12/11/2019 22:04 (CST)



PHONE:



[phone]



AGE:



64



N#



[id number]



GENDER:



Male



ENCOUNTER PHYSICIAN:



Dr. Fareed Nagel M.D.



ADMISSION DIAGNOSIS:



- Debility 16 -  Debility (16)

Decompensated Liver Cirrhosis . 



ROLL LEFT AND RIGHT:



ROLL LEFT AND RIGHT - STEP 1:



Does the patient complete the activity by him/herself with no assistance (physical, verbal/nonverbal 
cueing, setup/clean-up)? Yes.



1. [id number] ADMISSION PERFORMANCE:



Independent   



CODE:



06  



SIT TO LYING:



SIT TO LYING - STEP 1:



Does the patient complete the activity by him/herself with no assistance (physical, verbal/nonverbal 
cueing, setup/clean-up)? No.



SIT TO LYING - STEP 2:



Does the patient need only setup/clean-up assistance from one helper? No.



SIT TO LYING - STEP 3:



Does the patient need only verbal/nonverbal cueing or touching/steadying/contact guard assistance fro
m one helper? Yes.



1. [id number] ADMISSION PERFORMANCE:



Supervision or touching assistance   



CODE:



04  



LYING TO SITTING:



LYING TO SITTING ON SIDE OF BED - STEP 1:



Does the patient complete the activity by him/herself with no assistance (physical, verbal/nonverbal 
cueing, setup/clean-up)? No.



LYING TO SITTING ON SIDE OF BED - STEP 2:



Does the patient need only setup/clean-up assistance from one helper? No.



LYING TO SITTING ON SIDE OF BED - STEP 3:



Does the patient need only verbal/nonverbal cueing or touching/steadying/contact guard assistance fro
m one helper? Yes.



1. [id number] ADMISSION PERFORMANCE:



Supervision or touching assistance   



CODE:



04  



SIT TO STAND:



SIT TO STAND - STEP 1:



Does the patient complete the activity by him/herself with no assistance (physical, verbal/nonverbal 
cueing, setup/clean-up)? No.



SIT TO STAND - STEP 2:



Does the patient need only setup/clean-up assistance from one helper? No.



SIT TO STAND - STEP 3:



Does the patient need only verbal/nonverbal cueing or touching/steadying/contact guard assistance fro
m one helper? Yes.



1. [id number] ADMISSION PERFORMANCE:



Supervision or touching assistance   



CODE:



04  



TRANSFERS: BED, CHAIR:



CHAIR/BED-TO-CHAIR TRANSFER - STEP 1:



Does the patient complete the activity by him/herself with no assistance (physical, verbal/nonverbal 
cueing, setup/clean-up)? No.



CHAIR/BED-TO-CHAIR TRANSFER - STEP 2:



Does the patient need only setup/clean-up assistance from one helper? No.



CHAIR/BED-TO-CHAIR TRANSFER - STEP 3:



Does the patient need only verbal/nonverbal cueing or touching/steadying/contact guard assistance fro
m one helper? Yes.



1. [id number] ADMISSION PERFORMANCE:



Supervision or touching assistance   



CODE:



04  



TRANSFER TOILET:



TOILET TRANSFER - STEP 1:



Does the patient complete the activity by him/herself with no assistance (physical, verbal/nonverbal 
cueing, setup/clean-up)? No.



TOILET TRANSFER - STEP 2:



Does the patient need only setup/clean-up assistance from one helper? No.



TOILET TRANSFER - STEP 3:



Does the patient need only verbal/nonverbal cueing or touching/steadying/contact guard assistance fro
m one helper? Yes.



1. [id number] ADMISSION PERFORMANCE:



Supervision or touching assistance   



CODE:



04  



TRANSFERS: CAR:



 Not attempted due to environmental limitations (e.g., lack of equipment, weather constraints) 



CODE:



10  



WALK 10 FEET:



WALK 10 FEET - STEP 1:



Does the patient complete the activity by him/herself with no assistance (physical, verbal/nonverbal 
cueing, setup/clean-up)? No.



WALK 10 FEET - STEP 2:



Does the patient need only setup/clean-up assistance from one helper? No.



WALK 10 FEET - STEP 3:



Does the patient need only verbal/nonverbal cueing or touching/steadying/contact guard assistance fro
m one helper? Yes.



1. [id number] ADMISSION PERFORMANCE:



Supervision or touching assistance   



CODE:



04   



WALK 50 FEET:



WALK 50 FEET - STEP 1:



Does the patient complete the activity by him/herself with no assistance (physical, verbal/nonverbal 
cueing, setup/clean-up)? No.



WALK 50 FEET - STEP 2:



Does the patient need only setup/clean-up assistance from one helper? No.



WALK 50 FEET - STEP 3:



Does the patient need only verbal/nonverbal cueing or touching/steadying/contact guard assistance fro
m one helper? Yes.



1. [id number] ADMISSION PERFORMANCE:



Supervision or touching assistance   



CODE:



04  



WALK 150 FEET:



WALK 150 FEET - STEP 1:



Does the patient complete the activity by him/herself with no assistance (physical, verbal/nonverbal 
cueing, setup/clean-up)? No.



WALK 150 FEET - STEP 2:



Does the patient need only setup/clean-up assistance from one helper? No.



WALK 150 FEET - STEP 3:



Does the patient need only verbal/nonverbal cueing or touching/steadying/contact guard assistance fro
m one helper? Yes.



1. [id number] ADMISSION PERFORMANCE:



Supervision or touching assistance   



CODE:



04  



WALK 10 FEET UNEVEN:



Not attempted due to medical condition or safety concerns 



CODE:



88  



1 STEP (CURB):



Not attempted due to medical condition or safety concerns 



CODE:



88   



PICKING UP OBJECT:



Not attempted due to medical condition or safety concerns 



CODE:



88  



DOES THE PATIENT USE A WHEELCHAIR/SCOOTER?



Q1. DOES THE PATIENT USE A WHEELCHAIR/SCOOTER?:



No    



CODE:



0  



INDICATE THE TYPE OF WHEELCHAIR/SCOOTER USED:



CODE:



EXPR  



INDICATE THE TYPE OF WHEELCHAIR/SCOOTER USED:



CODE:



EXPR  



BLADDER AND BOWEL:



CODE:



EXPR  



CODE:



EXPR  



SIGNATURE PANEL:



The following modified sections: 1. [id number] Admission Performance, 1. MW4491I Admission Performance, 
1. PQ2961L Admission Performance, 1. IX3657C Admission Performance, 1. ZU1826S Admission Performance,
 1. [id number] Admission Performance, 1. XA1143G Admission Performance, 1. YB6686H Admission Performance
, 1. CS4970J Admission Performance, Q1. Does the patient use a wheelchair/scooter? were [electronical
ly] signed by Ayaz Leigh PT on Thu Dec 12 2019 15:54:16 GMT-0600 (Central Standard Time)

## 2019-12-13 LAB
BUN BLD-MCNC: 18 MG/DL (ref 7–18)
GLUCOSE SERPLBLD-MCNC: 112 MG/DL (ref 74–106)
HCT VFR BLD CALC: 25.4 % (ref 39.6–49)
LYMPHOCYTES # SPEC AUTO: 0.1 K/UL (ref 0.7–4.9)
MAGNESIUM SERPL-MCNC: 1.7 MG/DL (ref 1.8–2.4)
PMV BLD: 8.7 FL (ref 7.6–11.3)
POTASSIUM SERPL-SCNC: 4.1 MMOL/L (ref 3.5–5.1)
RBC # BLD: 2.79 M/UL (ref 4.33–5.43)

## 2019-12-13 RX ADMIN — CEFEPIME SCH MLS: 1 INJECTION, POWDER, FOR SOLUTION INTRAMUSCULAR; INTRAVENOUS at 20:54

## 2019-12-13 RX ADMIN — Medication SCH: at 20:00

## 2019-12-13 RX ADMIN — SPIRONOLACTONE SCH MG: 25 TABLET, FILM COATED ORAL at 16:42

## 2019-12-13 RX ADMIN — MAGNESIUM OXIDE TAB 400 MG (241.3 MG ELEMENTAL MG) SCH MG: 400 (241.3 MG) TAB at 20:55

## 2019-12-13 RX ADMIN — LACTULOSE SCH: 20 SOLUTION ORAL at 20:00

## 2019-12-13 RX ADMIN — PANTOPRAZOLE SODIUM SCH MG: 40 TABLET, DELAYED RELEASE ORAL at 16:41

## 2019-12-13 RX ADMIN — ONDANSETRON PRN MG: 4 TABLET, ORALLY DISINTEGRATING ORAL at 21:01

## 2019-12-13 RX ADMIN — ONDANSETRON PRN MG: 4 TABLET, ORALLY DISINTEGRATING ORAL at 10:03

## 2019-12-13 RX ADMIN — FOLIC ACID-PYRIDOXINE-CYANOCOBALAMIN TAB 2.5-25-2 MG SCH TAB: 2.5-25-2 TAB at 08:00

## 2019-12-13 RX ADMIN — OXYCODONE HYDROCHLORIDE SCH: 20 TABLET, FILM COATED, EXTENDED RELEASE ORAL at 08:00

## 2019-12-13 RX ADMIN — Medication SCH ML: at 10:05

## 2019-12-13 RX ADMIN — SPIRONOLACTONE SCH MG: 25 TABLET, FILM COATED ORAL at 10:04

## 2019-12-13 RX ADMIN — LACTULOSE SCH: 20 SOLUTION ORAL at 08:00

## 2019-12-13 RX ADMIN — OXYCODONE HYDROCHLORIDE SCH MG: 20 TABLET, FILM COATED, EXTENDED RELEASE ORAL at 20:55

## 2019-12-13 RX ADMIN — FUROSEMIDE SCH MG: 20 TABLET ORAL at 10:03

## 2019-12-13 RX ADMIN — PANTOPRAZOLE SODIUM SCH MG: 40 TABLET, DELAYED RELEASE ORAL at 07:30

## 2019-12-13 RX ADMIN — OXYCODONE HYDROCHLORIDE SCH MG: 20 TABLET, FILM COATED, EXTENDED RELEASE ORAL at 13:45

## 2019-12-13 RX ADMIN — GABAPENTIN SCH MG: 300 CAPSULE ORAL at 20:56

## 2019-12-13 RX ADMIN — FUROSEMIDE SCH MG: 20 TABLET ORAL at 16:41

## 2019-12-13 RX ADMIN — GABAPENTIN SCH: 300 CAPSULE ORAL at 08:00

## 2019-12-13 RX ADMIN — Medication SCH ML: at 08:00

## 2019-12-13 RX ADMIN — Medication SCH ML: at 20:54

## 2019-12-13 RX ADMIN — CEFEPIME SCH MLS: 1 INJECTION, POWDER, FOR SOLUTION INTRAMUSCULAR; INTRAVENOUS at 09:57

## 2019-12-13 RX ADMIN — MAGNESIUM OXIDE TAB 400 MG (241.3 MG ELEMENTAL MG) SCH MG: 400 (241.3 MG) TAB at 08:00

## 2019-12-13 RX ADMIN — Medication SCH TAB: at 11:57

## 2019-12-13 RX ADMIN — IRON SUPPLEMENT SCH MG: 325 TABLET ORAL at 11:57

## 2019-12-13 NOTE — P.RH.PN
Estimated Length of Stay: 12


Expected Discharge Date: 12/22/19


Discharge Disposition Plan: Home


Family Support: Yes


Long Term Goal: Mobility, Transfers, Self Care


Vital Signs: 


 Last Vital Signs











Temp  96.7 F L  12/12/19 18:46


 


Pulse  68   12/13/19 10:04


 


Resp  16   12/12/19 18:46


 


BP  118/78   12/13/19 10:04


 


Pulse Ox  96   12/12/19 18:46











Laboratory: 


 Laboratory Last Values











WBC  1.3 K/uL (4.3-10.9)  L*  12/13/19  05:40    


 


RBC  2.79 M/uL (4.33-5.43)  L  12/13/19  05:40    


 


Hgb  8.4 g/dL (13.6-17.9)  L  12/13/19  05:40    


 


Hct  25.4 % (39.6-49.0)  L  12/13/19  05:40    


 


MCV  91.1 fL ()   12/13/19  05:40    


 


MCH  30.0 pg (27.0-35.0)   12/13/19  05:40    


 


MCHC  33.0 g/dL (32.0-36.0)   12/13/19  05:40    


 


RDW  18.4 % (12.1-15.2)  H  12/13/19  05:40    


 


Plt Count  92 K/uL (152-406)  L  12/13/19  05:40    


 


MPV  8.7 fL (7.6-11.3)   12/13/19  05:40    


 


Neutrophils %  74.6 % (41.7-73.7)  H  12/13/19  05:40    


 


Lymphocytes %  9.4 % (15.3-44.8)  L  12/13/19  05:40    


 


Monocytes %  10.4 % (3.3-12.3)   12/13/19  05:40    


 


Eosinophils %  3.9 % (0-4.4)   12/13/19  05:40    


 


Basophils %  1.7 % (0-1.3)  H  12/13/19  05:40    


 


Absolute Neutrophils  1.0 K/uL (1.8-8.0)  L  12/13/19  05:40    


 


Absolute Lymphocytes  0.1 K/uL (0.7-4.9)  L  12/13/19  05:40    


 


Absolute Monocytes  0.1 K/uL (0.1-1.3)   12/13/19  05:40    


 


Absolute Eosinophils  0.1 K/uL (0-0.5)   12/13/19  05:40    


 


Absolute Basophils  0.0 K/uL (0-0.5)   12/13/19  05:40    


 


Poikilocytosis  1+   12/12/19  05:40    


 


Anisocytosis  1+   12/12/19  05:40    


 


Morphology Comment  Noted  (NOT SEEN)   12/12/19  05:40    


 


Sodium  135 mmol/L (136-145)  L  12/13/19  05:40    


 


Potassium  4.1 mmol/L (3.5-5.1)   12/13/19  05:40    


 


Chloride  105 mmol/L ()   12/13/19  05:40    


 


Carbon Dioxide  25 mmol/L (21-32)   12/13/19  05:40    


 


BUN  18 mg/dL (7-18)   12/13/19  05:40    


 


Creatinine  1.51 mg/dL (0.55-1.3)  H  12/13/19  05:40    


 


Estimated GFR  47 mL/min (=/>90)  L  12/13/19  05:40    


 


Glucose  112 mg/dL ()  H  12/13/19  05:40    


 


Calcium  8.4 mg/dL (8.5-10.1)  L  12/13/19  05:40    


 


Magnesium  1.7 mg/dL (1.8-2.4)  L  12/13/19  05:40    


 


Ammonia  20 umol/L (19-54)   12/13/19  05:40    


 


Albumin  3.3 g/dL (3.4-5.0)  L D 12/12/19  05:40    


 


Prealbumin  6.2 mg/dL (20-40)  L  12/12/19  05:40    


 


Urine Color  Dk yellow   12/11/19  22:50    


 


Urine Appearance  Clear   12/11/19  22:50    


 


Urine pH  5.5  (5.0-7.0)   12/11/19  22:50    


 


Ur Specific Gravity  1.015  (1.005-1.030)   12/11/19  22:50    


 


Urine Ketones  Trace  (NEG)   12/11/19  22:50    


 


Urine Blood  Negative  (NEG)   12/11/19  22:50    


 


Urine Nitrite  Negative  (NEG)   12/11/19  22:50    


 


Urine Bilirubin  Negative  (NEG)   12/11/19  22:50    


 


Urine Urobilinogen  0.2 mg/dL (0.2-1.0)   12/11/19  22:50    


 


Ur Leukocyte Esterase  Negative  (NEG)   12/11/19  22:50    


 


Urine RBC  None seen /HPF (NONE SEEN)   12/11/19  22:50    


 


Urine WBC  <5 /HPF (<5)   12/11/19  22:50    


 


Ur Squamous Epith Cells  <5 /HPF (NONE SEEN)   12/11/19  22:50    


 


Ur Urothelial Cells  <5 /HPF (NONE SEEN)   12/11/19  22:50    


 


Urine Bacteria  <20 /HPF (NONE SEEN)   12/11/19  22:50    


 


Urine Mucus  Slight /HPF (NONE SEEN)   12/11/19  22:50    


 


Urine Culture Reflexed  Not needed   12/11/19  22:50    


 


Urine Glucose  Negative  (NEG)   12/11/19  22:50    


 


Urine Total Protein  2+  (NEG)  H  12/11/19  22:50    











Weight: 239 lb


Closed Surgical Incision Present: No


Negative Pressure Wound Therapy Present: No


Physician Update: He is walking 250' with standby assistance. He is refusing to 

increase gabapentin dosage and did not want to taking any pain medications. He 

will be discharged on next week.


Medical Issues: Patient is always continent with bladder and bowel.


Functional Improvement: pt presents with moderate <-> severe strength deficits 

in the R LE.  pt demonstrates reduced balance and stability during ambulation 

and functional transfers.  pt is often limited by pain; however, this was not 

the case during the session.  pt exhibits poor trunk strength.  pt experiences 

poor tolerance to functional activity due to pain, fatigue, and weakness.  

Skilled PT services are necessary to address the above mentioned impairments 

and functional limitations.


Summary: Patient's care plan and long term goals have been reviewed and revised 

as necessary. Please see the Rehabilitation Signature page for all necessary 

signatures.

## 2019-12-14 RX ADMIN — GABAPENTIN SCH MG: 300 CAPSULE ORAL at 08:18

## 2019-12-14 RX ADMIN — IRON SUPPLEMENT SCH MG: 325 TABLET ORAL at 08:19

## 2019-12-14 RX ADMIN — CEFEPIME SCH MLS: 1 INJECTION, POWDER, FOR SOLUTION INTRAMUSCULAR; INTRAVENOUS at 19:25

## 2019-12-14 RX ADMIN — FOLIC ACID-PYRIDOXINE-CYANOCOBALAMIN TAB 2.5-25-2 MG SCH TAB: 2.5-25-2 TAB at 08:18

## 2019-12-14 RX ADMIN — Medication SCH: at 19:22

## 2019-12-14 RX ADMIN — PANTOPRAZOLE SODIUM SCH MG: 40 TABLET, DELAYED RELEASE ORAL at 08:19

## 2019-12-14 RX ADMIN — PANTOPRAZOLE SODIUM SCH MG: 40 TABLET, DELAYED RELEASE ORAL at 16:55

## 2019-12-14 RX ADMIN — FUROSEMIDE SCH MG: 20 TABLET ORAL at 08:19

## 2019-12-14 RX ADMIN — Medication SCH TAB: at 08:19

## 2019-12-14 RX ADMIN — Medication SCH ML: at 19:25

## 2019-12-14 RX ADMIN — Medication SCH ML: at 08:21

## 2019-12-14 RX ADMIN — MAGNESIUM OXIDE TAB 400 MG (241.3 MG ELEMENTAL MG) SCH MG: 400 (241.3 MG) TAB at 08:19

## 2019-12-14 RX ADMIN — OXYCODONE HYDROCHLORIDE PRN MG: 5 TABLET ORAL at 17:11

## 2019-12-14 RX ADMIN — ONDANSETRON PRN MG: 4 TABLET, ORALLY DISINTEGRATING ORAL at 19:26

## 2019-12-14 RX ADMIN — GABAPENTIN SCH MG: 300 CAPSULE ORAL at 19:22

## 2019-12-14 RX ADMIN — FUROSEMIDE SCH MG: 20 TABLET ORAL at 16:54

## 2019-12-14 RX ADMIN — LACTULOSE SCH GM: 20 SOLUTION ORAL at 08:16

## 2019-12-14 RX ADMIN — SPIRONOLACTONE SCH MG: 25 TABLET, FILM COATED ORAL at 16:55

## 2019-12-14 RX ADMIN — LACTULOSE SCH GM: 20 SOLUTION ORAL at 19:21

## 2019-12-14 RX ADMIN — SPIRONOLACTONE SCH MG: 25 TABLET, FILM COATED ORAL at 08:18

## 2019-12-14 RX ADMIN — MAGNESIUM OXIDE TAB 400 MG (241.3 MG ELEMENTAL MG) SCH MG: 400 (241.3 MG) TAB at 19:22

## 2019-12-14 RX ADMIN — LACTULOSE SCH: 20 SOLUTION ORAL at 20:00

## 2019-12-14 RX ADMIN — OXYCODONE HYDROCHLORIDE SCH MG: 20 TABLET, FILM COATED, EXTENDED RELEASE ORAL at 19:26

## 2019-12-14 RX ADMIN — Medication SCH: at 08:00

## 2019-12-14 RX ADMIN — CEFEPIME SCH MLS: 1 INJECTION, POWDER, FOR SOLUTION INTRAMUSCULAR; INTRAVENOUS at 08:21

## 2019-12-14 RX ADMIN — OXYCODONE HYDROCHLORIDE SCH MG: 20 TABLET, FILM COATED, EXTENDED RELEASE ORAL at 08:17

## 2019-12-14 RX ADMIN — MELATONIN PRN MG: 3 TAB ORAL at 19:27

## 2019-12-14 NOTE — FAST
SHIFT START DATE/TIME:



12/13/2019 19:00 (CST)



SHIFT END DATE/TIME:



12/14/2019 07:00 (CST)



NAME



JONE LUU



YOB: 1955



DATE OF ADMISSION:



12/11/2019 22:04 (CST)



PHONE:



[phone]



AGE:



64



SSN#



[id number]



GENDER:



Male



ENCOUNTER PHYSICIAN:



Dr. Fareed Nagel M.D.



ADMISSION DIAGNOSIS:



- Debility 16 -  Debility (16)

Decompensated Liver Cirrhosis . 



EATING:



Not assessed/no information 



CODE:



-  



ORAL HYGIENE:



Not assessed/no information 



CODE:



-  



TOILETING HYGIENE:



TOILETING HYGIENE - STEP 1:



Does the patient complete the activity by him/herself with no assistance (physical, verbal/nonverbal 
cueing, setup/clean-up)? No.



TOILETING HYGIENE - STEP 2:



Does the patient need only setup/clean-up assistance from one helper? No.



TOILETING HYGIENE - STEP 3:



Does the patient need only verbal/nonverbal cueing or touching/steadying/contact guard assistance fro
m one helper? Yes.



1. [id number] ADMISSION PERFORMANCE:



Supervision or touching assistance   



CODE:



04  



BATHING:



Not assessed/no information 



CODE:



-  



DRESSING - UPPER BODY:



Not assessed/no information 



CODE:



-  



DRESSING - LOWER BODY:



Not assessed/no information 



CODE:



-  



PUTTING ON/TAKING OFF FOOTWEAR:



Not assessed/no information 



CODE:



-  



ROLL LEFT AND RIGHT:



Not assessed/no information 



CODE:



-  



SIT TO LYING:



Not assessed/no information 



CODE:



-  



LYING TO SITTING:



Not assessed/no information 



CODE:



-  



SIT TO STAND:



Not assessed/no information 



CODE:



-  



TRANSFERS: BED, CHAIR:



Not assessed/no information 



CODE:



-  



TRANSFER TOILET:



TOILET TRANSFER - STEP 1:



Does the patient complete the activity by him/herself with no assistance (physical, verbal/nonverbal 
cueing, setup/clean-up)? No.



TOILET TRANSFER - STEP 2:



Does the patient need only setup/clean-up assistance from one helper? No.



TOILET TRANSFER - STEP 3:



Does the patient need only verbal/nonverbal cueing or touching/steadying/contact guard assistance fro
m one helper? Yes.



1. [id number] ADMISSION PERFORMANCE:



Supervision or touching assistance   



CODE:



04  



TRANSFERS: CAR:



Not assessed/no information 



CODE:



-  



WALK 10 FEET:



Not assessed/no information 



CODE:



-   



1 STEP (CURB):



Not assessed/no information 



CODE:



-   



PICKING UP OBJECT:



Not assessed/no information 



CODE:



-  



DOES THE PATIENT USE A WHEELCHAIR/SCOOTER?



CODE:



EXPR  



WHEEL 50 FEET WITH TWO TURNS:



Not assessed/no information 



CODE:



-  



INDICATE THE TYPE OF WHEELCHAIR/SCOOTER USED:



CODE:



EXPR  



WHEEL 150 FEET:



Not assessed/no information 



CODE:



-  



INDICATE THE TYPE OF WHEELCHAIR/SCOOTER USED:



CODE:



EXPR  



BLADDER AND BOWEL:



H350. BLADDER CONTINENCE (3-DAY ASSESSMENT PERIOD):



Always continent (no documented incontinence)   



CODE:



0  



H400. BOWEL CONTINENCE (3-DAY ASSESSMENT PERIOD):



Always continent   



CODE:



0

## 2019-12-14 NOTE — FAST
ENCOUNTER DATE AND TIME:



12/12/2019 08:00 (CST)



NAME



JONE LUU



YOB: 1955



DATE OF ADMISSION:



12/11/2019 22:04 (CST)



PHONE:



[phone]



AGE:



64



N#



[id number]



GENDER:



Male



ENCOUNTER PHYSICIAN:



Dr. Fareed Nagel M.D.



ADMISSION DIAGNOSIS:



- Debility 16 -  Debility (16)

Decompensated Liver Cirrhosis . 



EATING:



EATING - STEP 1:



Does the patient complete the activity by him/herself with no assistance (physical, verbal/nonverbal 
cueing, setup/clean-up)? Yes.



1. [id number] ADMISSION PERFORMANCE:



Independent   



CODE:



06  



ORAL HYGIENE:



ORAL HYGIENE - STEP 1:



Does the patient complete the activity by him/herself with no assistance (physical, verbal/nonverbal 
cueing, setup/clean-up)? Yes.



1. [id number] ADMISSION PERFORMANCE:



Independent   



CODE:



06  



TOILETING HYGIENE:



Not assessed/no information 



CODE:



-  



BATHING:



SHOWER/BATHE SELF - STEP 1:



Does the patient complete the activity by him/herself with no assistance (physical, verbal/nonverbal 
cueing, setup/clean-up)? No.



SHOWER/BATHE SELF - STEP 2:



Does the patient need only setup/clean-up assistance from one helper? Yes.



1. [id number] ADMISSION PERFORMANCE:



Setup or clean-up assistance   



CODE:



05  



DRESSING - UPPER BODY:



DRESSING - UPPER BODY - STEP 1:



Does the patient complete the activity by him/herself with no assistance (physical, verbal/nonverbal 
cueing, setup/clean-up)? No.



DRESSING - UPPER BODY - STEP 2:



Does the patient need only setup/clean-up assistance from one helper? Yes.



1. [id number] ADMISSION PERFORMANCE:



Setup or clean-up assistance   



CODE:



05  



DRESSING - LOWER BODY:



DRESSING - LOWER BODY - STEP 1:



Does the patient complete the activity by him/herself with no assistance (physical, verbal/nonverbal 
cueing, setup/clean-up)? No.



DRESSING - LOWER BODY - STEP 2:



Does the patient need only setup/clean-up assistance from one helper? No.



DRESSING - LOWER BODY - STEP 3:



Does the patient need only verbal/nonverbal cueing or touching/steadying/contact guard assistance fro
m one helper? No.



DRESSING - LOWER BODY - STEP 4:



Does the patient need physical assistance - for example lifting or trunk support from one helper - wi
th the helper providing less than half of the effort? No.



DRESSING - LOWER BODY - STEP 5:



Does the patient need physical assistance - for example lifting or trunk support from one helper - wi
th the helper providing more than half of the effort? Yes.



1. [id number] ADMISSION PERFORMANCE:



Substantial/maximal assistance   



CODE:



02  



PUTTING ON/TAKING OFF FOOTWEAR:



FOOTWEAR - STEP 1:



Does the patient complete the activity by him/herself with no assistance (physical, verbal/nonverbal 
cueing, setup/clean-up)? No.



FOOTWEAR - STEP 2:



Does the patient need only setup/clean-up assistance from one helper? No.



FOOTWEAR - STEP 3:



Does the patient need only verbal/nonverbal cueing or touching/steadying/contact guard assistance fro
m one helper? No.



FOOTWEAR - STEP 4:



Does the patient need physical assistance - for example lifting or trunk support from one helper - wi
th the helper providing less than half of the effort? No.



FOOTWEAR - STEP 5:



Does the patient need physical assistance - for example lifting or trunk support from one helper - wi
th the helper providing more than half of the effort? No.



FOOTWEAR - STEP 6:



Does the helper provide all of the effort? OR Is the assistance of two or more helpers required to co
mplete the activity? Yes.



1. [id number] ADMISSION PERFORMANCE:



Dependent   



CODE:



01  



DOES THE PATIENT USE A WHEELCHAIR/SCOOTER?



CODE:



EXPR  



INDICATE THE TYPE OF WHEELCHAIR/SCOOTER USED:



CODE:



EXPR  



INDICATE THE TYPE OF WHEELCHAIR/SCOOTER USED:



CODE:



EXPR  



BLADDER AND BOWEL:



CODE:



EXPR  



CODE:



EXPR  



SIGNATURE PANEL:



The following modified sections: 1. [id number] Admission Performance, 1. VI7273T Admission Performance, 
1. BD2042t Admission Performance, 1. WH6850d Admission Performance, 1. AJ2986m Admission Performance,
 1. TF0541y Admission Performance, 1. ZH0161y Admission Performance were [electronically] signed by DARCIE Cuellar OT on Sat Dec 14 2019 09:41:57 GMT-0600 (Central Standard Time)

## 2019-12-15 RX ADMIN — FUROSEMIDE SCH MG: 20 TABLET ORAL at 07:29

## 2019-12-15 RX ADMIN — OXYCODONE HYDROCHLORIDE PRN MG: 5 TABLET ORAL at 13:37

## 2019-12-15 RX ADMIN — ONDANSETRON PRN MG: 4 TABLET, ORALLY DISINTEGRATING ORAL at 07:53

## 2019-12-15 RX ADMIN — Medication SCH: at 20:00

## 2019-12-15 RX ADMIN — SPIRONOLACTONE SCH MG: 25 TABLET, FILM COATED ORAL at 16:59

## 2019-12-15 RX ADMIN — MELATONIN PRN MG: 3 TAB ORAL at 20:52

## 2019-12-15 RX ADMIN — PANTOPRAZOLE SODIUM SCH MG: 40 TABLET, DELAYED RELEASE ORAL at 17:00

## 2019-12-15 RX ADMIN — FUROSEMIDE SCH MG: 20 TABLET ORAL at 17:01

## 2019-12-15 RX ADMIN — OXYCODONE HYDROCHLORIDE SCH MG: 20 TABLET, FILM COATED, EXTENDED RELEASE ORAL at 07:33

## 2019-12-15 RX ADMIN — Medication SCH: at 07:29

## 2019-12-15 RX ADMIN — OXYCODONE HYDROCHLORIDE SCH MG: 20 TABLET, FILM COATED, EXTENDED RELEASE ORAL at 20:52

## 2019-12-15 RX ADMIN — GABAPENTIN SCH MG: 300 CAPSULE ORAL at 07:33

## 2019-12-15 RX ADMIN — ONDANSETRON PRN MG: 4 TABLET, ORALLY DISINTEGRATING ORAL at 20:52

## 2019-12-15 RX ADMIN — MAGNESIUM OXIDE TAB 400 MG (241.3 MG ELEMENTAL MG) SCH MG: 400 (241.3 MG) TAB at 07:29

## 2019-12-15 RX ADMIN — IRON SUPPLEMENT SCH MG: 325 TABLET ORAL at 07:29

## 2019-12-15 RX ADMIN — SPIRONOLACTONE SCH MG: 25 TABLET, FILM COATED ORAL at 07:31

## 2019-12-15 RX ADMIN — FOLIC ACID-PYRIDOXINE-CYANOCOBALAMIN TAB 2.5-25-2 MG SCH TAB: 2.5-25-2 TAB at 07:33

## 2019-12-15 RX ADMIN — LACTULOSE SCH GM: 20 SOLUTION ORAL at 20:52

## 2019-12-15 RX ADMIN — GABAPENTIN SCH MG: 300 CAPSULE ORAL at 20:52

## 2019-12-15 RX ADMIN — Medication SCH TAB: at 07:31

## 2019-12-15 RX ADMIN — PANTOPRAZOLE SODIUM SCH MG: 40 TABLET, DELAYED RELEASE ORAL at 07:29

## 2019-12-15 RX ADMIN — Medication SCH ML: at 20:53

## 2019-12-15 RX ADMIN — Medication SCH ML: at 07:34

## 2019-12-15 RX ADMIN — MAGNESIUM OXIDE TAB 400 MG (241.3 MG ELEMENTAL MG) SCH MG: 400 (241.3 MG) TAB at 20:52

## 2019-12-15 RX ADMIN — CEFEPIME SCH MLS: 1 INJECTION, POWDER, FOR SOLUTION INTRAMUSCULAR; INTRAVENOUS at 20:51

## 2019-12-15 RX ADMIN — LACTULOSE SCH GM: 20 SOLUTION ORAL at 07:28

## 2019-12-15 RX ADMIN — CEFEPIME SCH MLS: 1 INJECTION, POWDER, FOR SOLUTION INTRAMUSCULAR; INTRAVENOUS at 07:28

## 2019-12-16 RX ADMIN — Medication SCH TAB: at 09:05

## 2019-12-16 RX ADMIN — OXYCODONE HYDROCHLORIDE PRN MG: 5 TABLET ORAL at 02:07

## 2019-12-16 RX ADMIN — OXYCODONE HYDROCHLORIDE PRN MG: 5 TABLET ORAL at 13:21

## 2019-12-16 RX ADMIN — ONDANSETRON PRN MG: 4 TABLET, ORALLY DISINTEGRATING ORAL at 07:56

## 2019-12-16 RX ADMIN — Medication SCH: at 08:00

## 2019-12-16 RX ADMIN — CEFEPIME SCH MLS: 1 INJECTION, POWDER, FOR SOLUTION INTRAMUSCULAR; INTRAVENOUS at 09:00

## 2019-12-16 RX ADMIN — FUROSEMIDE SCH MG: 20 TABLET ORAL at 09:05

## 2019-12-16 RX ADMIN — TRAMADOL HYDROCHLORIDE PRN MG: 50 TABLET, COATED ORAL at 09:07

## 2019-12-16 RX ADMIN — LACTULOSE SCH GM: 20 SOLUTION ORAL at 09:00

## 2019-12-16 RX ADMIN — SPIRONOLACTONE SCH MG: 25 TABLET, FILM COATED ORAL at 16:55

## 2019-12-16 RX ADMIN — GABAPENTIN SCH MG: 300 CAPSULE ORAL at 20:53

## 2019-12-16 RX ADMIN — ONDANSETRON PRN MG: 4 TABLET, ORALLY DISINTEGRATING ORAL at 13:16

## 2019-12-16 RX ADMIN — LACTULOSE SCH GM: 20 SOLUTION ORAL at 20:53

## 2019-12-16 RX ADMIN — PANTOPRAZOLE SODIUM SCH MG: 40 TABLET, DELAYED RELEASE ORAL at 07:47

## 2019-12-16 RX ADMIN — FUROSEMIDE SCH MG: 20 TABLET ORAL at 16:55

## 2019-12-16 RX ADMIN — Medication PRN EA: at 09:02

## 2019-12-16 RX ADMIN — CEFEPIME SCH MLS: 1 INJECTION, POWDER, FOR SOLUTION INTRAMUSCULAR; INTRAVENOUS at 20:53

## 2019-12-16 RX ADMIN — OXYCODONE HYDROCHLORIDE SCH MG: 20 TABLET, FILM COATED, EXTENDED RELEASE ORAL at 07:47

## 2019-12-16 RX ADMIN — SPIRONOLACTONE SCH MG: 25 TABLET, FILM COATED ORAL at 09:05

## 2019-12-16 RX ADMIN — MELATONIN PRN MG: 3 TAB ORAL at 20:57

## 2019-12-16 RX ADMIN — Medication SCH ML: at 20:53

## 2019-12-16 RX ADMIN — IRON SUPPLEMENT SCH MG: 325 TABLET ORAL at 09:05

## 2019-12-16 RX ADMIN — GABAPENTIN SCH MG: 300 CAPSULE ORAL at 09:00

## 2019-12-16 RX ADMIN — Medication SCH ML: at 08:00

## 2019-12-16 RX ADMIN — PANTOPRAZOLE SODIUM SCH MG: 40 TABLET, DELAYED RELEASE ORAL at 16:55

## 2019-12-16 RX ADMIN — FOLIC ACID-PYRIDOXINE-CYANOCOBALAMIN TAB 2.5-25-2 MG SCH TAB: 2.5-25-2 TAB at 09:05

## 2019-12-16 RX ADMIN — OXYCODONE HYDROCHLORIDE SCH MG: 20 TABLET, FILM COATED, EXTENDED RELEASE ORAL at 20:52

## 2019-12-16 RX ADMIN — Medication SCH: at 20:00

## 2019-12-16 RX ADMIN — MAGNESIUM OXIDE TAB 400 MG (241.3 MG ELEMENTAL MG) SCH MG: 400 (241.3 MG) TAB at 09:05

## 2019-12-16 RX ADMIN — MAGNESIUM OXIDE TAB 400 MG (241.3 MG ELEMENTAL MG) SCH MG: 400 (241.3 MG) TAB at 20:53

## 2019-12-16 RX ADMIN — ONDANSETRON PRN MG: 4 TABLET, ORALLY DISINTEGRATING ORAL at 20:51

## 2019-12-16 NOTE — FAST
ENCOUNTER DATE AND TIME:



12/16/2019 08:00 (CST)



NAME



JONE LUU



YOB: 1955



DATE OF ADMISSION:



12/11/2019 22:04 (CST)



PHONE:



[phone]



AGE:



64



N#



[id number]



GENDER:



Male



ENCOUNTER PHYSICIAN:



Dr. Fareed Nagel M.D.



ADMISSION DIAGNOSIS:



- Debility 16 -  Debility (16)

Decompensated Liver Cirrhosis . 



EATING:



Not assessed/no information 



CODE:



-  



ORAL HYGIENE:



ORAL HYGIENE - STEP 1:



Does the patient complete the activity by him/herself with no assistance (physical, verbal/nonverbal 
cueing, setup/clean-up)? Yes.



1. [id number] ADMISSION PERFORMANCE:



Independent   



CODE:



06  



TOILETING HYGIENE:



Not assessed/no information 



CODE:



-  



BATHING:



SHOWER/BATHE SELF - STEP 1:



Does the patient complete the activity by him/herself with no assistance (physical, verbal/nonverbal 
cueing, setup/clean-up)? No.



SHOWER/BATHE SELF - STEP 2:



Does the patient need only setup/clean-up assistance from one helper? No.



SHOWER/BATHE SELF - STEP 3:



Does the patient need only verbal/nonverbal cueing or touching/steadying/contact guard assistance fro
m one helper? Yes.



1. [id number] ADMISSION PERFORMANCE:



Supervision or touching assistance   



CODE:



04  



DRESSING - UPPER BODY:



DRESSING - UPPER BODY - STEP 1:



Does the patient complete the activity by him/herself with no assistance (physical, verbal/nonverbal 
cueing, setup/clean-up)? Yes.



1. [id number] ADMISSION PERFORMANCE:



Independent   



CODE:



06  



DRESSING - LOWER BODY:



DRESSING - LOWER BODY - STEP 1:



Does the patient complete the activity by him/herself with no assistance (physical, verbal/nonverbal 
cueing, setup/clean-up)? No.



DRESSING - LOWER BODY - STEP 2:



Does the patient need only setup/clean-up assistance from one helper? No.



DRESSING - LOWER BODY - STEP 3:



Does the patient need only verbal/nonverbal cueing or touching/steadying/contact guard assistance fro
m one helper? Yes.



1. [id number] ADMISSION PERFORMANCE:



Supervision or touching assistance   



CODE:



04  



PUTTING ON/TAKING OFF FOOTWEAR:



FOOTWEAR - STEP 1:



Does the patient complete the activity by him/herself with no assistance (physical, verbal/nonverbal 
cueing, setup/clean-up)? No.



FOOTWEAR - STEP 2:



Does the patient need only setup/clean-up assistance from one helper? No.



FOOTWEAR - STEP 3:



Does the patient need only verbal/nonverbal cueing or touching/steadying/contact guard assistance fro
m one helper? Yes.



1. [id number] ADMISSION PERFORMANCE:



Supervision or touching assistance   



CODE:



04  



DOES THE PATIENT USE A WHEELCHAIR/SCOOTER?



CODE:



EXPR  



INDICATE THE TYPE OF WHEELCHAIR/SCOOTER USED:



CODE:



EXPR  



INDICATE THE TYPE OF WHEELCHAIR/SCOOTER USED:



CODE:



EXPR  



BLADDER AND BOWEL:



CODE:



EXPR  



CODE:



EXPR  



SIGNATURE PANEL:



The following modified sections: 1. FB2846B Admission Performance, 1. NA6600x Admission Performance, 
1. XS1988p Admission Performance, 1. GV1666n Admission Performance, 1. IS5634s Admission Performance 
were [electronically] signed by SURAJ Cruz on Mon Dec 16 2019 12:54:59 GMT-0600 (Central
 Standard Time)

## 2019-12-16 NOTE — R.PN
ENCOUNTER DATE AND TIME:



12/16/2019 18:01 (CST)



NAME



JONE LUU



YOB: 1955



DATE OF ADMISSION:



12/11/2019 22:04 (CST)



Decompensated Liver Cirrhosis CHIEF COMPLAINT:



Debility and liver cirrhosis.



SUBJECTIVE:



Pt denied any depression.

Pt denied any Shortness of Breath.

Ambulated 240' using a rolling walker with minimum assistance. Mobilized wheelchair 250' with standby
 assistance.

WBC 1.3 likely secondary to chronic cirrhosis.



VITAL SIGNS



Temperature: 97.7 F

SBP/DBP: 138/64

Pulse: 86

Resp: 16



MEDICATION ALLERGIES:



No Known Drug Allergies (NKDA)



ENVIRONMENTAL ALLERGIES:





Shellfish

- Substance Allergies

None Known

- Other Allergies

None Known



NURSING:



- Shower

allowing shower



ACTIVITIES



OOB only with supervision



THERAPIES:



- Dietary and Nutrition

Adequate Nutrition. Nutritional Education. Nutritional Supplements. 



PHYSICAL EXAM



- Gen

Alert and awake

Lying in bed

No apparent distress

Oriented to: person, time, and place

- Skin

No skin breakdown.



Normacephalic

- Eyes

No abnormalities

- ENMT

No abnormalities

- Neck

No abnormalities



No cervical adenopathy

- CVS

RRR

- Chest

No abnormalities

- Resp

Clear to auscultation

- Abd

Soft

- GI

Non distended



Deferred

- 

No abnormalities

- Ext

Mild bilateral lower extremity edema.

- MSK

4+/5 weakness in both lower extremities.

- Neuro

No focal deficits

- Psych

No abnormalities



ASSESSMENT:



Pt. is a 65 yo Right-handed white male.On 11/29/2019 he was admitted to [facility] with diagnosis Decompensated Liver Cirrhosis .His impairment category is Debility 16 -  Debil
ity (16).Pre-morbidly, Pt. was independent/mod-I in Transfers Control, Safety Awareness, Locomotion, 
Balance, Social Cognition, Sphincter Control, Self-Care, and Endurance; and he had good Safety Awaren
ess.Currently, he has deficits of Locomotion, Balance, Transfers Control, Self-Care, and Endurance.Pt
. is now referred to [facility] for acute in-patient rehabilitation in order t
o maximize patient's functional independence in activities of daily living, strength, ROM, and mobili
ty.- Rehab Goal

Patient has realistic goal of being discharged at assistance level 6-Victorina to reside at Home with  Fam
kayden/Relatives.

MDM/PLAN:



- Physical Therapy

 Gait dysfunction - to improve, our physical therapists will perform initial evaluation of pt's statu
s upon admission and devise an individualized program for Gait Training, and Wheel Chair mobility

 Inability to transfer - to improve, our physical therapists will perform initial evaluation of pt's 
status upon admission and devise an individualized program for Bed mobility

 Need for home safety evaluation - to improve, our physical therapists will perform initial evaluatio
n of pt's status upon admission and devise an individualized program for Home Evaluation

 Need in caregiver upon discharge - to improve, our physical therapists will perform initial evaluati
on of pt's status upon admission and devise an individualized program for Caregiver Training

Edema - to improve, our physical therapists will perform initial evaluation of pt's status upon admis
shashi and devise an individualized program for Elevation Training, and Lymphedema Therapy

 New precaution - to improve, our physical therapists will perform initial evaluation of pt's status 
upon admission and devise an individualized program for Patient precaution education

 Poor balance - to improve, our physical therapists will perform initial evaluation of pt's status up
on admission and devise an individualized program for Balance Training

 Poor endurance - to improve, our physical therapists will perform initial evaluation of pt's status 
upon admission and devise an individualized program for Endurance Training

 Weakness - to improve, our physical therapists will perform initial evaluation of pt's status upon a
dmission and devise an individualized program for Aquatic Therapy, Neuromuscular Reeducation, and Str
engthening

Achieving independence - to improve, our physical therapists will perform initial evaluation of pt's 
status upon admission and devise an individualized program for Community Reintegration Activities

- Occupational Therapy

 ADL deficits - to improve, our occupation therapists will perform initial evaluation of pt's status 
upon admission and devise an individualized program for Bathing, Bed mobility, Community Reintegratio
n, Cooking, Dressing, Eating, Fine Motor Skills, Grooming, Homemaking, Kitchen Mobility, Laundry, Pat
ient Education, Safety Awareness, Splinting - Positioning, Transfers(Toilet, Tub, Shower), and Wheel 
Chair Management

 Need for care giver - to improve, our occupation therapists will perform initial evaluation of pt's 
status upon admission and devise an individualized program for Caregiver Training

 Weakness - to improve, our occupation therapists will perform initial evaluation of pt's status upon
 admission and devise an individualized program for Aquatic Therapy, Balance, Endurance, UE ROM, and 
UE strengthening

- Other

 See attached MAR (Medication Administration Record)

- Diet Type

Continue Regular

- Diet - Liquid Texture

Continue Regular

- Tube Feed

Continue N/A

- Diet - Solid Texture

Continue Regular

- Shower

 allowing shower



FUNCTIONAL STATUS: UPDATED AT WEEKLY TEAM CONFERENCE



- Bladder

Same accident frequency: 7-Ind - No accidents in the past 7 days

- Bowel

Same accident frequency: 7-Ind - No accidents in the past 7 days

- Walking

Same score based on distance walked: 0(N/A)

- Wheelchair

Same score based on distance traveled: 0(N/A)



FUNCTIONAL STATUS:



- Self-Care

A. Eating    Ind   

B. Grooming    Ind   

C. Bathing    Ilene   

D. Dressing - Upper     sup   

E. Dressing - Lower     Ilene   

F. Toileting    Ilene   

- Sphincter Control

G. Bladder control     Ilene   

H. Bowel control     Ilene   

- Transfers Control

I. Bed/Chair/Wheelchair     sup   

J. Toilet    Ilene   

K. Tub/Shower    Ilene   

- Locomotion

L. Walk/Wheelchair (B)     Ilene   

M. Stairs    maxA   

- Communication

N. Comprehension (B)     sup   

O. Expression (B)     sup   

- Social Cognition

P. Social Interaction    Ind   

Q. Problem Solving    Ind   

R. Memory    sup   

- Endurance

Good

- Balance

Good

- Safety Awareness

Good



QI SCORES:



- Self-Care

A. Eating  04-Supervision or touching assistance  

B. Oral hygiene  04-Supervision or touching assistance  

C. Toileting hygiene  03-Partial/moderate assistance  

E. Shower/bathe self  03-Partial/moderate assistance  

F. Upper body dressing  03-Partial/moderate assistance  

G. Lower body dressing  03-Partial/moderate assistance  

H. Putting on/taking off footwear  03-Partial/moderate assistance  

- Mobility

A. Roll left and right  03-Partial/moderate assistance  

B. Sit to lying  03-Partial/moderate assistance  

C. Lying to sitting on side of bed  88-Not attempted due to medical condition or safety concerns  

D. Sit to stand  03-Partial/moderate assistance  

E. Chair/bed-to-chair transfer  03-Partial/moderate assistance  

F. Toilet transfer  03-Partial/moderate assistance  

G. Car transfer  88-Not attempted due to medical condition or safety concerns  

I. Walk 10 feet  03-Partial/moderate assistance  

J. Walk 50 feet with two turns  88-Not attempted due to medical condition or safety concerns  

K. Walk 150 feet  88-Not attempted due to medical condition or safety concerns  

L. Walking 10 feet on uneven surfaces  88-Not attempted due to medical condition or safety concerns  


M. 1 step (curb)  88-Not attempted due to medical condition or safety concerns  

N. 4 steps  88-Not attempted due to medical condition or safety concerns  

O. 12 steps  88-Not attempted due to medical condition or safety concerns  

P. Picking up object  88-Not attempted due to medical condition or safety concerns  

R. Wheel 50 feet with two turns  88-Not attempted due to medical condition or safety concerns  

S. Wheel 150 feet  88-Not attempted due to medical condition or safety concerns  

- Bladder and Bowel

Bladder continence  0-Always continent  

Bowel continence  0-Always continent  

- Endurance

Fair

- Balance

Fair

- Safety Awareness

Fair



[facility]:



Self-Care, Mobility, Endurance, Balance, and Safety Awareness



SIGNATURE PANEL:



Electronically signed by Dr. Fareed Nagel M.D. on 12/16/2019 at 18:08 (CST)

## 2019-12-17 RX ADMIN — ONDANSETRON PRN MG: 4 TABLET, ORALLY DISINTEGRATING ORAL at 20:16

## 2019-12-17 RX ADMIN — OXYCODONE HYDROCHLORIDE SCH MG: 20 TABLET, FILM COATED, EXTENDED RELEASE ORAL at 20:12

## 2019-12-17 RX ADMIN — OXYCODONE HYDROCHLORIDE PRN MG: 5 TABLET ORAL at 02:46

## 2019-12-17 RX ADMIN — FUROSEMIDE SCH MG: 20 TABLET ORAL at 09:00

## 2019-12-17 RX ADMIN — OXYCODONE HYDROCHLORIDE SCH MG: 20 TABLET, FILM COATED, EXTENDED RELEASE ORAL at 09:01

## 2019-12-17 RX ADMIN — GABAPENTIN SCH MG: 300 CAPSULE ORAL at 20:13

## 2019-12-17 RX ADMIN — LACTULOSE SCH GM: 20 SOLUTION ORAL at 20:11

## 2019-12-17 RX ADMIN — Medication SCH ML: at 09:39

## 2019-12-17 RX ADMIN — ONDANSETRON PRN MG: 4 TABLET, ORALLY DISINTEGRATING ORAL at 12:20

## 2019-12-17 RX ADMIN — Medication SCH: at 08:00

## 2019-12-17 RX ADMIN — CEFEPIME SCH MLS: 1 INJECTION, POWDER, FOR SOLUTION INTRAMUSCULAR; INTRAVENOUS at 20:12

## 2019-12-17 RX ADMIN — FOLIC ACID-PYRIDOXINE-CYANOCOBALAMIN TAB 2.5-25-2 MG SCH TAB: 2.5-25-2 TAB at 08:59

## 2019-12-17 RX ADMIN — SPIRONOLACTONE SCH MG: 25 TABLET, FILM COATED ORAL at 16:59

## 2019-12-17 RX ADMIN — FUROSEMIDE SCH MG: 20 TABLET ORAL at 16:59

## 2019-12-17 RX ADMIN — MAGNESIUM OXIDE TAB 400 MG (241.3 MG ELEMENTAL MG) SCH MG: 400 (241.3 MG) TAB at 20:12

## 2019-12-17 RX ADMIN — IRON SUPPLEMENT SCH MG: 325 TABLET ORAL at 09:00

## 2019-12-17 RX ADMIN — Medication SCH ML: at 20:25

## 2019-12-17 RX ADMIN — LACTULOSE SCH GM: 20 SOLUTION ORAL at 08:58

## 2019-12-17 RX ADMIN — Medication SCH: at 20:00

## 2019-12-17 RX ADMIN — OXYCODONE HYDROCHLORIDE PRN MG: 5 TABLET ORAL at 14:35

## 2019-12-17 RX ADMIN — GABAPENTIN SCH MG: 300 CAPSULE ORAL at 08:59

## 2019-12-17 RX ADMIN — SPIRONOLACTONE SCH MG: 25 TABLET, FILM COATED ORAL at 09:01

## 2019-12-17 RX ADMIN — CEFEPIME SCH MLS: 1 INJECTION, POWDER, FOR SOLUTION INTRAMUSCULAR; INTRAVENOUS at 09:33

## 2019-12-17 RX ADMIN — PANTOPRAZOLE SODIUM SCH MG: 40 TABLET, DELAYED RELEASE ORAL at 08:59

## 2019-12-17 RX ADMIN — PANTOPRAZOLE SODIUM SCH MG: 40 TABLET, DELAYED RELEASE ORAL at 16:59

## 2019-12-17 RX ADMIN — MAGNESIUM OXIDE TAB 400 MG (241.3 MG ELEMENTAL MG) SCH MG: 400 (241.3 MG) TAB at 08:59

## 2019-12-17 RX ADMIN — Medication SCH TAB: at 09:00

## 2019-12-17 NOTE — FAST
SHIFT START DATE/TIME:



12/16/2019 07:00 (CST)



SHIFT END DATE/TIME:



12/16/2019 19:00 (CST)



NAME



JONE LUU



YOB: 1955



DATE OF ADMISSION:



12/11/2019 22:04 (CST)



PHONE:



[phone]



AGE:



64



SSN#



[id number]



GENDER:



Male



ENCOUNTER PHYSICIAN:



Dr. Fareed Nagel M.D.



ADMISSION DIAGNOSIS:



- Debility 16 -  Debility (16)

Decompensated Liver Cirrhosis . 



EATING:



EATING - STEP 1:



Does the patient complete the activity by him/herself with no assistance (physical, verbal/nonverbal 
cueing, setup/clean-up)? Yes.



1. [id number] ADMISSION PERFORMANCE:



Independent   



CODE:



06  



ORAL HYGIENE:



Not assessed/no information 



CODE:



-  



TOILETING HYGIENE:



TOILETING HYGIENE - STEP 1:



Does the patient complete the activity by him/herself with no assistance (physical, verbal/nonverbal 
cueing, setup/clean-up)? No.



TOILETING HYGIENE - STEP 2:



Does the patient need only setup/clean-up assistance from one helper? Yes.



1. [id number] ADMISSION PERFORMANCE:



Setup or clean-up assistance   



CODE:



05  



BATHING:



Not assessed/no information 



CODE:



-  



DRESSING - UPPER BODY:



DRESSING - UPPER BODY - STEP 1:



Does the patient complete the activity by him/herself with no assistance (physical, verbal/nonverbal 
cueing, setup/clean-up)? Yes.



1. [id number] ADMISSION PERFORMANCE:



Independent   



CODE:



06  



DRESSING - LOWER BODY:



DRESSING - LOWER BODY - STEP 1:



Does the patient complete the activity by him/herself with no assistance (physical, verbal/nonverbal 
cueing, setup/clean-up)? No.



DRESSING - LOWER BODY - STEP 2:



Does the patient need only setup/clean-up assistance from one helper? No.



DRESSING - LOWER BODY - STEP 3:



Does the patient need only verbal/nonverbal cueing or touching/steadying/contact guard assistance fro
m one helper? Yes.



1. [id number] ADMISSION PERFORMANCE:



Supervision or touching assistance   



CODE:



04  



PUTTING ON/TAKING OFF FOOTWEAR:



FOOTWEAR - STEP 1:



Does the patient complete the activity by him/herself with no assistance (physical, verbal/nonverbal 
cueing, setup/clean-up)? No.



FOOTWEAR - STEP 2:



Does the patient need only setup/clean-up assistance from one helper? No.



FOOTWEAR - STEP 3:



Does the patient need only verbal/nonverbal cueing or touching/steadying/contact guard assistance fro
m one helper? Yes.



1. [id number] ADMISSION PERFORMANCE:



Supervision or touching assistance   



CODE:



04  



DOES THE PATIENT USE A WHEELCHAIR/SCOOTER?



CODE:



EXPR  



INDICATE THE TYPE OF WHEELCHAIR/SCOOTER USED:



CODE:



EXPR  



INDICATE THE TYPE OF WHEELCHAIR/SCOOTER USED:



CODE:



EXPR  



BLADDER AND BOWEL:



H350. BLADDER CONTINENCE (3-DAY ASSESSMENT PERIOD):



Incontinent less than daily (e.g., once or twice during the 3-day assessment period)   



CODE:



2  



H400. BOWEL CONTINENCE (3-DAY ASSESSMENT PERIOD):



Always continent   



CODE:



0  



SIGNATURE PANEL:



The following modified sections: 1. [id number] Admission Performance, 1. [id number] Admission Performance, 
1. VW2643s Admission Performance, 1. QB5119k Admission Performance, 1. DD5324e Admission Performance,
 H400. Bowel Continence (3-day assessment period), H350. Bladder Continence (3-day assessment period)
 were [electronically] signed by Amarilis Cole C.N.A. on Tue Dec 17 2019 11:05:47 GMT-0600 ([place] Time)

## 2019-12-18 RX ADMIN — Medication SCH TAB: at 07:31

## 2019-12-18 RX ADMIN — PANTOPRAZOLE SODIUM SCH MG: 40 TABLET, DELAYED RELEASE ORAL at 16:29

## 2019-12-18 RX ADMIN — OXYCODONE HYDROCHLORIDE PRN MG: 5 TABLET ORAL at 01:58

## 2019-12-18 RX ADMIN — MAGNESIUM OXIDE TAB 400 MG (241.3 MG ELEMENTAL MG) SCH MG: 400 (241.3 MG) TAB at 20:42

## 2019-12-18 RX ADMIN — IRON SUPPLEMENT SCH MG: 325 TABLET ORAL at 07:29

## 2019-12-18 RX ADMIN — CEFEPIME SCH MLS: 1 INJECTION, POWDER, FOR SOLUTION INTRAMUSCULAR; INTRAVENOUS at 07:28

## 2019-12-18 RX ADMIN — OXYCODONE HYDROCHLORIDE SCH MG: 20 TABLET, FILM COATED, EXTENDED RELEASE ORAL at 07:32

## 2019-12-18 RX ADMIN — ONDANSETRON PRN MG: 4 TABLET, ORALLY DISINTEGRATING ORAL at 20:48

## 2019-12-18 RX ADMIN — Medication PRN EA: at 07:29

## 2019-12-18 RX ADMIN — SPIRONOLACTONE SCH MG: 25 TABLET, FILM COATED ORAL at 08:34

## 2019-12-18 RX ADMIN — FUROSEMIDE SCH MG: 20 TABLET ORAL at 16:35

## 2019-12-18 RX ADMIN — LACTULOSE SCH GM: 20 SOLUTION ORAL at 07:29

## 2019-12-18 RX ADMIN — FOLIC ACID-PYRIDOXINE-CYANOCOBALAMIN TAB 2.5-25-2 MG SCH TAB: 2.5-25-2 TAB at 07:31

## 2019-12-18 RX ADMIN — LACTULOSE SCH GM: 20 SOLUTION ORAL at 20:38

## 2019-12-18 RX ADMIN — GABAPENTIN SCH MG: 300 CAPSULE ORAL at 20:43

## 2019-12-18 RX ADMIN — GABAPENTIN SCH MG: 300 CAPSULE ORAL at 07:30

## 2019-12-18 RX ADMIN — SPIRONOLACTONE SCH MG: 25 TABLET, FILM COATED ORAL at 16:29

## 2019-12-18 RX ADMIN — Medication SCH ML: at 07:34

## 2019-12-18 RX ADMIN — CEFEPIME SCH MLS: 1 INJECTION, POWDER, FOR SOLUTION INTRAMUSCULAR; INTRAVENOUS at 20:45

## 2019-12-18 RX ADMIN — OXYCODONE HYDROCHLORIDE SCH MG: 20 TABLET, FILM COATED, EXTENDED RELEASE ORAL at 20:44

## 2019-12-18 RX ADMIN — Medication SCH: at 07:34

## 2019-12-18 RX ADMIN — Medication SCH ML: at 20:45

## 2019-12-18 RX ADMIN — FUROSEMIDE SCH MG: 20 TABLET ORAL at 08:34

## 2019-12-18 RX ADMIN — ONDANSETRON PRN MG: 4 TABLET, ORALLY DISINTEGRATING ORAL at 07:30

## 2019-12-18 RX ADMIN — MAGNESIUM OXIDE TAB 400 MG (241.3 MG ELEMENTAL MG) SCH MG: 400 (241.3 MG) TAB at 07:31

## 2019-12-18 RX ADMIN — OXYCODONE HYDROCHLORIDE PRN MG: 5 TABLET ORAL at 14:01

## 2019-12-18 RX ADMIN — Medication SCH: at 20:00

## 2019-12-18 RX ADMIN — PANTOPRAZOLE SODIUM SCH MG: 40 TABLET, DELAYED RELEASE ORAL at 07:28

## 2019-12-18 NOTE — R.PN
ENCOUNTER DATE AND TIME:



12/17/2019 16:29 (CST)



NAME



JONE LUU



YOB: 1955



DATE OF ADMISSION:



12/11/2019 22:04 (CST)



Decompensated Liver Cirrhosis CHIEF COMPLAINT:



Debility and liver cirrhosis.



SUBJECTIVE:



Pt denied any depression.

Pt denied any Shortness of Breath.

Ambulated 1000' using a rolling walker with standby assistance. Up and down 15 steps with standby ass
istance.

WBC 1.3 likely secondary to chronic cirrhosis.



VITAL SIGNS



Temperature: 97.4 F

SBP/DBP: 111/59

Pulse: 85

Resp: 14



MEDICATION ALLERGIES:



No Known Drug Allergies (NKDA)



ENVIRONMENTAL ALLERGIES:





Shellfish

- Substance Allergies

None Known

- Other Allergies

None Known



NURSING:



- Shower

allowing shower



ACTIVITIES



OOB only with supervision



THERAPIES:



- Dietary and Nutrition

Adequate Nutrition. Nutritional Education. Nutritional Supplements. 



PHYSICAL EXAM



- Gen

Alert and awake

Lying in bed

No apparent distress

Oriented to: person, time, and place

- Skin

No skin breakdown.



Normacephalic

- Eyes

No abnormalities

- ENMT

No abnormalities

- Neck

No abnormalities



No cervical adenopathy

- CVS

RRR

- Chest

No abnormalities

- Resp

Clear to auscultation

- Abd

Soft

- GI

Non distended



Deferred

- 

No abnormalities

- Ext

Mild bilateral lower extremity edema.

- MSK

4+/5 weakness in both lower extremities.

- Neuro

No focal deficits

- Psych

No abnormalities



ASSESSMENT:



Pt. is a 63 yo Right-handed white male.On 11/29/2019 he was admitted to [facility] with diagnosis Decompensated Liver Cirrhosis .His impairment category is Debility 16 -  Debil
ity (16).Pre-morbidly, Pt. was independent/mod-I in Transfers Control, Safety Awareness, Locomotion, 
Balance, Social Cognition, Sphincter Control, Self-Care, and Endurance; and he had good Safety Awaren
ess.Currently, he has deficits of Locomotion, Balance, Transfers Control, Self-Care, and Endurance.Pt
. is now referred to [facility] for acute in-patient rehabilitation in order t
o maximize patient's functional independence in activities of daily living, strength, ROM, and mobili
ty.- Rehab Goal

Patient has realistic goal of being discharged at assistance level 6-Victorina to reside at Home with  Fam
kayden/Relatives.

MDM/PLAN:



- Physical Therapy

 Gait dysfunction - to improve, our physical therapists will perform initial evaluation of pt's statu
s upon admission and devise an individualized program for Gait Training, and Wheel Chair mobility

 Inability to transfer - to improve, our physical therapists will perform initial evaluation of pt's 
status upon admission and devise an individualized program for Bed mobility

 Need for home safety evaluation - to improve, our physical therapists will perform initial evaluatio
n of pt's status upon admission and devise an individualized program for Home Evaluation

 Need in caregiver upon discharge - to improve, our physical therapists will perform initial evaluati
on of pt's status upon admission and devise an individualized program for Caregiver Training

 Edema - to improve, our physical therapists will perform initial evaluation of pt's status upon admi
ssion and devise an individualized program for Elevation Training, and Lymphedema Therapy

 New precaution - to improve, our physical therapists will perform initial evaluation of pt's status 
upon admission and devise an individualized program for Patient precaution education

 Poor balance - to improve, our physical therapists will perform initial evaluation of pt's status up
on admission and devise an individualized program for Balance Training

 Poor endurance - to improve, our physical therapists will perform initial evaluation of pt's status 
upon admission and devise an individualized program for Endurance Training

 Weakness - to improve, our physical therapists will perform initial evaluation of pt's status upon a
dmission and devise an individualized program for Aquatic Therapy, Neuromuscular Reeducation, and Str
engthening

 Achieving independence - to improve, our physical therapists will perform initial evaluation of pt's
 status upon admission and devise an individualized program for Community Reintegration Activities

- Occupational Therapy

 ADL deficits - to improve, our occupation therapists will perform initial evaluation of pt's status 
upon admission and devise an individualized program for Bathing, Bed mobility, Community Reintegratio
n, Cooking, Dressing, Eating, Fine Motor Skills, Grooming, Homemaking, Kitchen Mobility, Laundry, Pat
ient Education, Safety Awareness, Splinting - Positioning, Transfers(Toilet, Tub, Shower), and Wheel 
Chair Management

 Need for care giver - to improve, our occupation therapists will perform initial evaluation of pt's 
status upon admission and devise an individualized program for Caregiver Training

 Weakness - to improve, our occupation therapists will perform initial evaluation of pt's status upon
 admission and devise an individualized program for Aquatic Therapy, Balance, Endurance, UE ROM, and 
UE strengthening

- Other

 See attached MAR (Medication Administration Record)

- Diet Type

Continue Regular

- Diet - Liquid Texture

Continue Regular

- Tube Feed

Continue N/A

- Diet - Solid Texture

Continue Regular

- Shower

 allowing shower



FUNCTIONAL STATUS: UPDATED AT WEEKLY TEAM CONFERENCE



- Bladder

Same accident frequency: 7-Ind - No accidents in the past 7 days

- Bowel

Same accident frequency: 7-Ind - No accidents in the past 7 days

- Walking

Same score based on distance walked: 0(N/A)

- Wheelchair

Same score based on distance traveled: 0(N/A)



FUNCTIONAL STATUS:



- Self-Care

A. Eating    Ind   

B. Grooming    Ind   

C. Bathing    Ilene   

D. Dressing - Upper     sup   

E. Dressing - Lower     Ilene   

F. Toileting    Ilene   

- Sphincter Control

G. Bladder control     Ilene   

H. Bowel control     Ilene   

- Transfers Control

I. Bed/Chair/Wheelchair     sup   

J. Toilet    Ilene   

K. Tub/Shower    Ilene   

- Locomotion

L. Walk/Wheelchair (B)     Ilene   

M. Stairs    maxA   

- Communication

N. Comprehension (B)     sup   

O. Expression (B)     sup   

- Social Cognition

P. Social Interaction    Ind   

Q. Problem Solving    Ind   

R. Memory    sup   

- Endurance

Good

- Balance

Good

- Safety Awareness

Good



QI SCORES:



- Self-Care

A. Eating  04-Supervision or touching assistance  

B. Oral hygiene  04-Supervision or touching assistance  

C. Toileting hygiene  03-Partial/moderate assistance  

E. Shower/bathe self  03-Partial/moderate assistance  

F. Upper body dressing  03-Partial/moderate assistance  

G. Lower body dressing  03-Partial/moderate assistance  

H. Putting on/taking off footwear  03-Partial/moderate assistance  

- Mobility

A. Roll left and right  03-Partial/moderate assistance  

B. Sit to lying  03-Partial/moderate assistance  

C. Lying to sitting on side of bed  88-Not attempted due to medical condition or safety concerns  

D. Sit to stand  03-Partial/moderate assistance  

E. Chair/bed-to-chair transfer  03-Partial/moderate assistance  

F. Toilet transfer  03-Partial/moderate assistance  

G. Car transfer  88-Not attempted due to medical condition or safety concerns  

I. Walk 10 feet  03-Partial/moderate assistance  

J. Walk 50 feet with two turns  88-Not attempted due to medical condition or safety concerns  

K. Walk 150 feet  88-Not attempted due to medical condition or safety concerns  

L. Walking 10 feet on uneven surfaces  88-Not attempted due to medical condition or safety concerns  


M. 1 step (curb)  88-Not attempted due to medical condition or safety concerns  

N. 4 steps  88-Not attempted due to medical condition or safety concerns  

O. 12 steps  88-Not attempted due to medical condition or safety concerns  

P. Picking up object  88-Not attempted due to medical condition or safety concerns  

R. Wheel 50 feet with two turns  88-Not attempted due to medical condition or safety concerns  

S. Wheel 150 feet  88-Not attempted due to medical condition or safety concerns  

- Bladder and Bowel

Bladder continence  0-Always continent  

Bowel continence  0-Always continent  

- Endurance

Fair

- Balance

Fair

- Safety Awareness

Fair



[facility]:



Self-Care, Mobility, Endurance, Balance, and Safety Awareness



SIGNATURE PANEL:



Electronically signed by Dr. Fareed Nagel M.D. on 12/18/2019 at 16:33 (CST)

## 2019-12-18 NOTE — FAST
ENCOUNTER DATE AND TIME:



12/18/2019 08:00 (CST)



NAME



JONE LUU



YOB: 1955



DATE OF ADMISSION:



12/11/2019 22:04 (CST)



PHONE:



[phone]



AGE:



64



N#



[id number]



GENDER:



Male



ENCOUNTER PHYSICIAN:



Dr. Fareed Nagel M.D.



ADMISSION DIAGNOSIS:



- Debility 16 -  Debility (16)

Decompensated Liver Cirrhosis . 



EATING:



Not assessed/no information 



CODE:



-  



ORAL HYGIENE:



ORAL HYGIENE - STEP 1:



Does the patient complete the activity by him/herself with no assistance (physical, verbal/nonverbal 
cueing, setup/clean-up)? Yes.



1. [id number] ADMISSION PERFORMANCE:



Independent   



CODE:



06  



TOILETING HYGIENE:



Not assessed/no information 



CODE:



-  



BATHING:



SHOWER/BATHE SELF - STEP 1:



Does the patient complete the activity by him/herself with no assistance (physical, verbal/nonverbal 
cueing, setup/clean-up)? No.



SHOWER/BATHE SELF - STEP 2:



Does the patient need only setup/clean-up assistance from one helper? No.



SHOWER/BATHE SELF - STEP 3:



Does the patient need only verbal/nonverbal cueing or touching/steadying/contact guard assistance fro
m one helper? Yes.



1. [id number] ADMISSION PERFORMANCE:



Supervision or touching assistance   



CODE:



04  



DRESSING - UPPER BODY:



DRESSING - UPPER BODY - STEP 1:



Does the patient complete the activity by him/herself with no assistance (physical, verbal/nonverbal 
cueing, setup/clean-up)? Yes.



1. [id number] ADMISSION PERFORMANCE:



Independent   



CODE:



06  



DRESSING - LOWER BODY:



DRESSING - LOWER BODY - STEP 1:



Does the patient complete the activity by him/herself with no assistance (physical, verbal/nonverbal 
cueing, setup/clean-up)? No.



DRESSING - LOWER BODY - STEP 2:



Does the patient need only setup/clean-up assistance from one helper? No.



DRESSING - LOWER BODY - STEP 3:



Does the patient need only verbal/nonverbal cueing or touching/steadying/contact guard assistance fro
m one helper? Yes.



1. [id number] ADMISSION PERFORMANCE:



Supervision or touching assistance   



CODE:



04  



PUTTING ON/TAKING OFF FOOTWEAR:



FOOTWEAR - STEP 1:



Does the patient complete the activity by him/herself with no assistance (physical, verbal/nonverbal 
cueing, setup/clean-up)? Yes.



1. [id number] ADMISSION PERFORMANCE:



Independent   



CODE:



06  



DOES THE PATIENT USE A WHEELCHAIR/SCOOTER?



CODE:



EXPR  



INDICATE THE TYPE OF WHEELCHAIR/SCOOTER USED:



CODE:



EXPR  



INDICATE THE TYPE OF WHEELCHAIR/SCOOTER USED:



CODE:



EXPR  



BLADDER AND BOWEL:



CODE:



EXPR  



CODE:



EXPR  



SIGNATURE PANEL:



The following modified sections: 1. [id number] Admission Performance, 1. [id number] Admission Performance, 
1. [id number] Admission Performance, 1. [id number] Admission Performance, 1. [id number] Admission Performance 
were [electronically] signed by SURAJ Cruz on Wed Dec 18 2019 16:20:34 GMT-0600 (Central
 Standard Time)

## 2019-12-18 NOTE — R.PN
ENCOUNTER DATE AND TIME:



12/18/2019 21:44 (CST)



NAME



JONE LUU



YOB: 1955



DATE OF ADMISSION:



12/11/2019 22:04 (CST)



Decompensated Liver Cirrhosis CHIEF COMPLAINT:



Debility and liver cirrhosis.



SUBJECTIVE:



Pt denied any depression.

Pt denied any Shortness of Breath.

Ambulated 1000' using a rolling walker with standby assistance. Up and down 15 steps with standby ass
istance.



VITAL SIGNS



Temperature: 97.6 F

SBP/DBP: 115/78

Pulse: 79

Resp: 14



MEDICATION ALLERGIES:



No Known Drug Allergies (NKDA)



ENVIRONMENTAL ALLERGIES:





Shellfish

- Substance Allergies

None Known

- Other Allergies

None Known



NURSING:



- Shower

allowing shower



ACTIVITIES



OOB only with supervision



THERAPIES:



- Dietary and Nutrition

Adequate Nutrition. Nutritional Education. Nutritional Supplements. 



PHYSICAL EXAM



- Gen

Alert and awake

Lying in bed

No apparent distress

Oriented to: person, time, and place

- Skin

No skin breakdown.



Normacephalic

- Eyes

No abnormalities

- ENMT

No abnormalities

- Neck

No abnormalities



No cervical adenopathy

- CVS

RRR

- Chest

No abnormalities

- Resp

Clear to auscultation

- Abd

Soft

- GI

Non distended



Deferred

- 

No abnormalities

- Ext

Mild bilateral lower extremity edema.

- MSK

4+/5 weakness in both lower extremities.

- Neuro

No focal deficits

- Psych

No abnormalities



ASSESSMENT:



Pt. is a 63 yo Right-handed white male.On 11/29/2019 he was admitted to [facility] with diagnosis Decompensated Liver Cirrhosis .His impairment category is Debility 16 -  Debil
ity (16).Pre-morbidly, Pt. was independent/mod-I in Transfers Control, Safety Awareness, Locomotion, 
Balance, Social Cognition, Sphincter Control, Self-Care, and Endurance; and he had good Safety Awaren
ess.Currently, he has deficits of Locomotion, Balance, Transfers Control, Self-Care, and Endurance.Pt
. is now referred to [facility] for acute in-patient rehabilitation in order t
o maximize patient's functional independence in activities of daily living, strength, ROM, and mobili
ty.- Rehab Goal

Patient has realistic goal of being discharged at assistance level 6-Victorina to reside at Home with  Fam
kayden/Relatives.

MDM/PLAN:



- Physical Therapy

 Gait dysfunction - to improve, our physical therapists will perform initial evaluation of pt's statu
s upon admission and devise an individualized program for Gait Training, and Wheel Chair mobility

 Inability to transfer - to improve, our physical therapists will perform initial evaluation of pt's 
status upon admission and devise an individualized program for Bed mobility

 Need for home safety evaluation - to improve, our physical therapists will perform initial evaluatio
n of pt's status upon admission and devise an individualized program for Home Evaluation

 Need in caregiver upon discharge - to improve, our physical therapists will perform initial evaluati
on of pt's status upon admission and devise an individualized program for Caregiver Training

 Edema - to improve, our physical therapists will perform initial evaluation of pt's status upon admi
ssion and devise an individualized program for Elevation Training, and Lymphedema Therapy

 New precaution - to improve, our physical therapists will perform initial evaluation of pt's status 
upon admission and devise an individualized program for Patient precaution education

 Poor balance - to improve, our physical therapists will perform initial evaluation of pt's status up
on admission and devise an individualized program for Balance Training

 Poor endurance - to improve, our physical therapists will perform initial evaluation of pt's status 
upon admission and devise an individualized program for Endurance Training

 Weakness - to improve, our physical therapists will perform initial evaluation of pt's status upon a
dmission and devise an individualized program for Aquatic Therapy, Neuromuscular Reeducation, and Str
engthening

 Achieving independence - to improve, our physical therapists will perform initial evaluation of pt's
 status upon admission and devise an individualized program for Community Reintegration Activities

- Occupational Therapy

 ADL deficits - to improve, our occupation therapists will perform initial evaluation of pt's status 
upon admission and devise an individualized program for Bathing, Bed mobility, Community Reintegratio
n, Cooking, Dressing, Eating, Fine Motor Skills, Grooming, Homemaking, Kitchen Mobility, Laundry, Pat
ient Education, Safety Awareness, Splinting - Positioning, Transfers(Toilet, Tub, Shower), and Wheel 
Chair Management

 Need for care giver - to improve, our occupation therapists will perform initial evaluation of pt's 
status upon admission and devise an individualized program for Caregiver Training

 Weakness - to improve, our occupation therapists will perform initial evaluation of pt's status upon
 admission and devise an individualized program for Aquatic Therapy, Balance, Endurance, UE ROM, and 
UE strengthening

- Other

 See attached MAR (Medication Administration Record)

- Diet Type

Continue Regular

- Diet - Liquid Texture

Continue Regular

- Tube Feed

Continue N/A

- Diet - Solid Texture

Continue Regular

- Shower

 allowing shower



FUNCTIONAL STATUS: UPDATED AT WEEKLY TEAM CONFERENCE



- Bladder

Same accident frequency: 7-Ind - No accidents in the past 7 days

- Bowel

Same accident frequency: 7-Ind - No accidents in the past 7 days

- Walking

Same score based on distance walked: 0(N/A)

- Wheelchair

Same score based on distance traveled: 0(N/A)



FUNCTIONAL STATUS:



- Self-Care

A. Eating    Ind   

B. Grooming    Ind   

C. Bathing    Ilene   

D. Dressing - Upper     sup   

E. Dressing - Lower     Ilene   

F. Toileting    Ilene   

- Sphincter Control

G. Bladder control     Ilene   

H. Bowel control     Ilene   

- Transfers Control

I. Bed/Chair/Wheelchair     sup   

J. Toilet    Ilene   

K. Tub/Shower    Ilene   

- Locomotion

L. Walk/Wheelchair (B)     Ilene   

M. Stairs    maxA   

- Communication

N. Comprehension (B)     sup   

O. Expression (B)     sup   

- Social Cognition

P. Social Interaction    Ind   

Q. Problem Solving    Ind   

R. Memory    sup   

- Endurance

Good

- Balance

Good

- Safety Awareness

Good



QI SCORES:



- Self-Care

A. Eating  04-Supervision or touching assistance  

B. Oral hygiene  04-Supervision or touching assistance  

C. Toileting hygiene  03-Partial/moderate assistance  

E. Shower/bathe self  03-Partial/moderate assistance  

F. Upper body dressing  03-Partial/moderate assistance  

G. Lower body dressing  03-Partial/moderate assistance  

H. Putting on/taking off footwear  03-Partial/moderate assistance  

- Mobility

A. Roll left and right  03-Partial/moderate assistance  

B. Sit to lying  03-Partial/moderate assistance  

C. Lying to sitting on side of bed  88-Not attempted due to medical condition or safety concerns  

D. Sit to stand  03-Partial/moderate assistance  

E. Chair/bed-to-chair transfer  03-Partial/moderate assistance  

F. Toilet transfer  03-Partial/moderate assistance  

G. Car transfer  88-Not attempted due to medical condition or safety concerns  

I. Walk 10 feet  03-Partial/moderate assistance  

J. Walk 50 feet with two turns  88-Not attempted due to medical condition or safety concerns  

K. Walk 150 feet  88-Not attempted due to medical condition or safety concerns  

L. Walking 10 feet on uneven surfaces  88-Not attempted due to medical condition or safety concerns  


M. 1 step (curb)  88-Not attempted due to medical condition or safety concerns  

N. 4 steps  88-Not attempted due to medical condition or safety concerns  

O. 12 steps  88-Not attempted due to medical condition or safety concerns  

P. Picking up object  88-Not attempted due to medical condition or safety concerns  

R. Wheel 50 feet with two turns  88-Not attempted due to medical condition or safety concerns  

S. Wheel 150 feet  88-Not attempted due to medical condition or safety concerns  

- Bladder and Bowel

Bladder continence  0-Always continent  

Bowel continence  0-Always continent  

- Endurance

Fair

- Balance

Fair

- Safety Awareness

Fair



[facility]



Self-Care, Mobility, Endurance, Balance, and Safety Awareness



SIGNATURE PANEL:



Electronically signed by Dr. Fareed Nagel M.D. on 12/18/2019 at 21:47 (CST)

## 2019-12-19 LAB
ALBUMIN SERPL BCP-MCNC: 2.9 G/DL (ref 3.4–5)
BUN BLD-MCNC: 22 MG/DL (ref 7–18)
GLUCOSE SERPLBLD-MCNC: 167 MG/DL (ref 74–106)
HCT VFR BLD CALC: 26.9 % (ref 39.6–49)
LYMPHOCYTES # SPEC AUTO: 0.2 K/UL (ref 0.7–4.9)
MAGNESIUM SERPL-MCNC: 1.8 MG/DL (ref 1.8–2.4)
PMV BLD: 8.7 FL (ref 7.6–11.3)
POTASSIUM SERPL-SCNC: 4.6 MMOL/L (ref 3.5–5.1)
PREALB SERPL-MCNC: 5.4 MG/DL (ref 20–40)
RBC # BLD: 2.98 M/UL (ref 4.33–5.43)

## 2019-12-19 RX ADMIN — ONDANSETRON PRN MG: 4 TABLET, ORALLY DISINTEGRATING ORAL at 07:34

## 2019-12-19 RX ADMIN — PANTOPRAZOLE SODIUM SCH MG: 40 TABLET, DELAYED RELEASE ORAL at 16:18

## 2019-12-19 RX ADMIN — LACTULOSE SCH GM: 20 SOLUTION ORAL at 19:48

## 2019-12-19 RX ADMIN — SPIRONOLACTONE SCH MG: 25 TABLET, FILM COATED ORAL at 16:19

## 2019-12-19 RX ADMIN — GABAPENTIN SCH MG: 300 CAPSULE ORAL at 07:24

## 2019-12-19 RX ADMIN — Medication SCH: at 08:00

## 2019-12-19 RX ADMIN — FUROSEMIDE SCH MG: 20 TABLET ORAL at 07:24

## 2019-12-19 RX ADMIN — CEFEPIME SCH MLS: 1 INJECTION, POWDER, FOR SOLUTION INTRAMUSCULAR; INTRAVENOUS at 07:38

## 2019-12-19 RX ADMIN — SPIRONOLACTONE SCH MG: 25 TABLET, FILM COATED ORAL at 07:25

## 2019-12-19 RX ADMIN — Medication SCH ML: at 07:25

## 2019-12-19 RX ADMIN — GABAPENTIN SCH MG: 300 CAPSULE ORAL at 19:50

## 2019-12-19 RX ADMIN — PANTOPRAZOLE SODIUM SCH MG: 40 TABLET, DELAYED RELEASE ORAL at 07:23

## 2019-12-19 RX ADMIN — MAGNESIUM OXIDE TAB 400 MG (241.3 MG ELEMENTAL MG) SCH MG: 400 (241.3 MG) TAB at 07:23

## 2019-12-19 RX ADMIN — OXYCODONE HYDROCHLORIDE SCH MG: 20 TABLET, FILM COATED, EXTENDED RELEASE ORAL at 19:49

## 2019-12-19 RX ADMIN — MAGNESIUM OXIDE TAB 400 MG (241.3 MG ELEMENTAL MG) SCH MG: 400 (241.3 MG) TAB at 19:50

## 2019-12-19 RX ADMIN — Medication SCH ML: at 19:51

## 2019-12-19 RX ADMIN — OXYCODONE HYDROCHLORIDE SCH MG: 20 TABLET, FILM COATED, EXTENDED RELEASE ORAL at 07:25

## 2019-12-19 RX ADMIN — Medication SCH TAB: at 07:24

## 2019-12-19 RX ADMIN — MELATONIN PRN MG: 3 TAB ORAL at 19:50

## 2019-12-19 RX ADMIN — FUROSEMIDE SCH MG: 20 TABLET ORAL at 16:19

## 2019-12-19 RX ADMIN — OXYCODONE HYDROCHLORIDE PRN MG: 5 TABLET ORAL at 13:30

## 2019-12-19 RX ADMIN — CEFEPIME SCH MLS: 1 INJECTION, POWDER, FOR SOLUTION INTRAMUSCULAR; INTRAVENOUS at 19:49

## 2019-12-19 RX ADMIN — FOLIC ACID-PYRIDOXINE-CYANOCOBALAMIN TAB 2.5-25-2 MG SCH TAB: 2.5-25-2 TAB at 07:24

## 2019-12-19 RX ADMIN — Medication SCH: at 19:51

## 2019-12-19 RX ADMIN — ONDANSETRON PRN MG: 4 TABLET, ORALLY DISINTEGRATING ORAL at 19:50

## 2019-12-19 RX ADMIN — IRON SUPPLEMENT SCH MG: 325 TABLET ORAL at 07:24

## 2019-12-19 RX ADMIN — OXYCODONE HYDROCHLORIDE PRN MG: 5 TABLET ORAL at 02:11

## 2019-12-19 RX ADMIN — LACTULOSE SCH GM: 20 SOLUTION ORAL at 07:23

## 2019-12-19 RX ADMIN — LACTULOSE SCH: 20 SOLUTION ORAL at 20:00

## 2019-12-19 NOTE — R.PN
ENCOUNTER DATE AND TIME:



12/19/2019 20:46 (CST)



NAME



JONE LUU



YOB: 1955



DATE OF ADMISSION:



12/11/2019 22:04 (CST)



Decompensated Liver Cirrhosis CHIEF COMPLAINT:



Debility and liver cirrhosis.



SUBJECTIVE:



Pt denied any depression.

Pt denied any Shortness of Breath.

Ambulated 1000' using a rolling walker with standby assistance. Up and down 15 steps with standby ass
istance.



VITAL SIGNS



Temperature: 97.4 F

SBP/DBP: 121/78

Pulse: 82

Resp: 14



MEDICATION ALLERGIES:



No Known Drug Allergies (NKDA)



ENVIRONMENTAL ALLERGIES:





Shellfish

- Substance Allergies

None Known

- Other Allergies

None Known



NURSING:



- Shower

allowing shower



ACTIVITIES



OOB only with supervision



THERAPIES:



- Dietary and Nutrition

Adequate Nutrition. Nutritional Education. Nutritional Supplements. 



PHYSICAL EXAM



- Gen

Alert and awake

Lying in bed

No apparent distress

Oriented to: person, time, and place

- Skin

No skin breakdown.



Normacephalic

- Eyes

No abnormalities

- ENMT

No abnormalities

- Neck

No abnormalities



No cervical adenopathy

- CVS

RRR

- Chest

No abnormalities

- Resp

Clear to auscultation

- Abd

Soft

- GI

Non distended



Deferred

- 

No abnormalities

- Ext

Mild bilateral lower extremity edema.

- MSK

4+/5 weakness in both lower extremities.

- Neuro

No focal deficits

- Psych

No abnormalities



ASSESSMENT:



Pt. is a 65 yo Right-handed white male.On 11/29/2019 he was admitted to [facility] with diagnosis Decompensated Liver Cirrhosis .His impairment category is Debility 16 -  Debil
ity (16).Pre-morbidly, Pt. was independent/mod-I in Transfers Control, Safety Awareness, Locomotion, 
Balance, Social Cognition, Sphincter Control, Self-Care, and Endurance; and he had good Safety Awaren
ess.Currently, he has deficits of Locomotion, Balance, Transfers Control, Self-Care, and Endurance.Pt
. is now referred to [facility] for acute in-patient rehabilitation in order t
o maximize patient's functional independence in activities of daily living, strength, ROM, and mobili
ty.- Rehab Goal

Patient has realistic goal of being discharged at assistance level 6-Victorina to reside at Home with  Fam
kayden/Relatives.

MDM/PLAN:



- Physical Therapy

 Gait dysfunction - to improve, our physical therapists will perform initial evaluation of pt's statu
s upon admission and devise an individualized program for Gait Training, and Wheel Chair mobility

 Inability to transfer - to improve, our physical therapists will perform initial evaluation of pt's 
status upon admission and devise an individualized program for Bed mobility

 Need for home safety evaluation - to improve, our physical therapists will perform initial evaluatio
n of pt's status upon admission and devise an individualized program for Home Evaluation

 Need in caregiver upon discharge - to improve, our physical therapists will perform initial evaluati
on of pt's status upon admission and devise an individualized program for Caregiver Training

 Edema - to improve, our physical therapists will perform initial evaluation of pt's status upon admi
ssion and devise an individualized program for Elevation Training, and Lymphedema Therapy

 New precaution - to improve, our physical therapists will perform initial evaluation of pt's status 
upon admission and devise an individualized program for Patient precaution education

 Poor balance - to improve, our physical therapists will perform initial evaluation of pt's status up
on admission and devise an individualized program for Balance Training

 Poor endurance - to improve, our physical therapists will perform initial evaluation of pt's status 
upon admission and devise an individualized program for Endurance Training

 Weakness - to improve, our physical therapists will perform initial evaluation of pt's status upon a
dmission and devise an individualized program for Aquatic Therapy, Neuromuscular Reeducation, and Str
engthening

 Achieving independence - to improve, our physical therapists will perform initial evaluation of pt's
 status upon admission and devise an individualized program for Community Reintegration Activities

- Occupational Therapy

 ADL deficits - to improve, our occupation therapists will perform initial evaluation of pt's status 
upon admission and devise an individualized program for Bathing, Bed mobility, Community Reintegratio
n, Cooking, Dressing, Eating, Fine Motor Skills, Grooming, Homemaking, Kitchen Mobility, Laundry, Pat
ient Education, Safety Awareness, Splinting - Positioning, Transfers(Toilet, Tub, Shower), and Wheel 
Chair Management

 Need for care giver - to improve, our occupation therapists will perform initial evaluation of pt's 
status upon admission and devise an individualized program for Caregiver Training

 Weakness - to improve, our occupation therapists will perform initial evaluation of pt's status upon
 admission and devise an individualized program for Aquatic Therapy, Balance, Endurance, UE ROM, and 
UE strengthening

- Other

 See attached MAR (Medication Administration Record)

- Diet Type

Continue Regular

- Diet - Liquid Texture

Continue Regular

- Tube Feed

Continue N/A

- Diet - Solid Texture

Continue Regular

- Shower

 allowing shower



FUNCTIONAL STATUS: UPDATED AT WEEKLY TEAM CONFERENCE



- Bladder

Same accident frequency: 7-Ind - No accidents in the past 7 days

- Bowel

Same accident frequency: 7-Ind - No accidents in the past 7 days

- Walking

Same score based on distance walked: 0(N/A)

- Wheelchair

Same score based on distance traveled: 0(N/A)



FUNCTIONAL STATUS:



- Self-Care

A. Eating    Ind   

B. Grooming    Ind   

C. Bathing    Ilene   

D. Dressing - Upper     sup   

E. Dressing - Lower     Ileen   

F. Toileting    Ilene   

- Sphincter Control

G. Bladder control     Ilene   

H. Bowel control     Ilene   

- Transfers Control

I. Bed/Chair/Wheelchair     sup   

J. Toilet    Ilene   

K. Tub/Shower    Ilene   

- Locomotion

L. Walk/Wheelchair (B)     Ilene   

M. Stairs    maxA   

- Communication

N. Comprehension (B)     sup   

O. Expression (B)     sup   

- Social Cognition

P. Social Interaction    Ind   

Q. Problem Solving    Ind   

R. Memory    sup   

- Endurance

Good

- Balance

Good

- Safety Awareness

Good



QI SCORES:



- Self-Care

A. Eating  04-Supervision or touching assistance  

B. Oral hygiene  04-Supervision or touching assistance  

C. Toileting hygiene  03-Partial/moderate assistance  

E. Shower/bathe self  03-Partial/moderate assistance  

F. Upper body dressing  03-Partial/moderate assistance  

G. Lower body dressing  03-Partial/moderate assistance  

H. Putting on/taking off footwear  03-Partial/moderate assistance  

- Mobility

A. Roll left and right  03-Partial/moderate assistance  

B. Sit to lying  03-Partial/moderate assistance  

C. Lying to sitting on side of bed  88-Not attempted due to medical condition or safety concerns  

D. Sit to stand  03-Partial/moderate assistance  

E. Chair/bed-to-chair transfer  03-Partial/moderate assistance  

F. Toilet transfer  03-Partial/moderate assistance  

G. Car transfer  88-Not attempted due to medical condition or safety concerns  

I. Walk 10 feet  03-Partial/moderate assistance  

J. Walk 50 feet with two turns  88-Not attempted due to medical condition or safety concerns  

K. Walk 150 feet  88-Not attempted due to medical condition or safety concerns  

L. Walking 10 feet on uneven surfaces  88-Not attempted due to medical condition or safety concerns  


M. 1 step (curb)  88-Not attempted due to medical condition or safety concerns  

N. 4 steps  88-Not attempted due to medical condition or safety concerns  

O. 12 steps  88-Not attempted due to medical condition or safety concerns  

P. Picking up object  88-Not attempted due to medical condition or safety concerns  

R. Wheel 50 feet with two turns  88-Not attempted due to medical condition or safety concerns  

S. Wheel 150 feet  88-Not attempted due to medical condition or safety concerns  

- Bladder and Bowel

Bladder continence  0-Always continent  

Bowel continence  0-Always continent  

- Endurance

Fair

- Balance

Fair

- Safety Awareness

Fair



[facility]



Self-Care, Mobility, Endurance, Balance, and Safety Awareness



SIGNATURE PANEL:



Electronically signed by Dr. Fareed Nagel M.D. on 12/19/2019 at 20:48 (CST)

## 2019-12-19 NOTE — RAD REPORT
EXAM DESCRIPTION:  RAD - Abdomen 1 View (KUB) - 12/19/2019 4:07 pm

 

CLINICAL HISTORY:  ascites

Pain

 

COMPARISON:  No comparisons

 

FINDINGS:  The bowel gas pattern is non-obstructive. No evidence of free air or pneumatosis. No suspi
cious calcifications.

 

No significant bony findings.

 

 

IMPRESSION:  Negative examination.

## 2019-12-19 NOTE — FAST
ENCOUNTER DATE AND TIME:



12/19/2019 08:00 (CST)



NAME



JONE LUU



YOB: 1955



DATE OF ADMISSION:



12/11/2019 22:04 (CST)



PHONE:



[phone]



AGE:



64



N#



[id number]



GENDER:



Male



ENCOUNTER PHYSICIAN:



Dr. Fareed Nagel M.D.



ADMISSION DIAGNOSIS:



- Debility 16 -  Debility (16)

Decompensated Liver Cirrhosis . 



ROLL LEFT AND RIGHT:



ROLL LEFT AND RIGHT - STEP 1:



Does the patient complete the activity by him/herself with no assistance (physical, verbal/nonverbal 
cueing, setup/clean-up)? No.



ROLL LEFT AND RIGHT - STEP 2:



Does the patient need only setup/clean-up assistance from one helper? Yes.



1. [id number] ADMISSION PERFORMANCE:



Setup or clean-up assistance   



CODE:



05  



SIT TO LYING:



SIT TO LYING - STEP 1:



Does the patient complete the activity by him/herself with no assistance (physical, verbal/nonverbal 
cueing, setup/clean-up)? No.



SIT TO LYING - STEP 2:



Does the patient need only setup/clean-up assistance from one helper? Yes.



1. [id number] ADMISSION PERFORMANCE:



Setup or clean-up assistance   



CODE:



05  



LYING TO SITTING:



LYING TO SITTING ON SIDE OF BED - STEP 1:



Does the patient complete the activity by him/herself with no assistance (physical, verbal/nonverbal 
cueing, setup/clean-up)? No.



LYING TO SITTING ON SIDE OF BED - STEP 2:



Does the patient need only setup/clean-up assistance from one helper? Yes.



1. [id number] ADMISSION PERFORMANCE:



Setup or clean-up assistance   



CODE:



05  



SIT TO STAND:



SIT TO STAND - STEP 1:



Does the patient complete the activity by him/herself with no assistance (physical, verbal/nonverbal 
cueing, setup/clean-up)? No.



SIT TO STAND - STEP 2:



Does the patient need only setup/clean-up assistance from one helper? Yes.



1. [id number] ADMISSION PERFORMANCE:



Setup or clean-up assistance   



CODE:



05  



TRANSFERS: BED, CHAIR:



CHAIR/BED-TO-CHAIR TRANSFER - STEP 1:



Does the patient complete the activity by him/herself with no assistance (physical, verbal/nonverbal 
cueing, setup/clean-up)? No.



CHAIR/BED-TO-CHAIR TRANSFER - STEP 2:



Does the patient need only setup/clean-up assistance from one helper? Yes.



1. [id number] ADMISSION PERFORMANCE:



Setup or clean-up assistance   



CODE:



05  



TRANSFER TOILET:



TOILET TRANSFER - STEP 1:



Does the patient complete the activity by him/herself with no assistance (physical, verbal/nonverbal 
cueing, setup/clean-up)? No.



TOILET TRANSFER - STEP 2:



Does the patient need only setup/clean-up assistance from one helper? Yes.



1. [id number] ADMISSION PERFORMANCE:



Setup or clean-up assistance   



CODE:



05  



TRANSFERS: CAR:



 Not attempted due to environmental limitations (e.g., lack of equipment, weather constraints) 



CODE:



10  



WALK 10 FEET:



WALK 10 FEET - STEP 1:



Does the patient complete the activity by him/herself with no assistance (physical, verbal/nonverbal 
cueing, setup/clean-up)? No.



WALK 10 FEET - STEP 2:



Does the patient need only setup/clean-up assistance from one helper? Yes.



1. [id number] ADMISSION PERFORMANCE:



Setup or clean-up assistance   



CODE:



05   



WALK 50 FEET:



WALK 50 FEET - STEP 1:



Does the patient complete the activity by him/herself with no assistance (physical, verbal/nonverbal 
cueing, setup/clean-up)? No.



WALK 50 FEET - STEP 2:



Does the patient need only setup/clean-up assistance from one helper? Yes.



1. [id number] ADMISSION PERFORMANCE:



Setup or clean-up assistance   



CODE:



05  



WALK 150 FEET:



WALK 150 FEET - STEP 1:



Does the patient complete the activity by him/herself with no assistance (physical, verbal/nonverbal 
cueing, setup/clean-up)? No.



WALK 150 FEET - STEP 2:



Does the patient need only setup/clean-up assistance from one helper? Yes.



1. [id number] ADMISSION PERFORMANCE:



Setup or clean-up assistance   



CODE:



05  



WALK 10 FEET UNEVEN:



Not attempted due to medical condition or safety concerns 



CODE:



88  



1 STEP (CURB):



1 STEP CURB - STEP 1:



Does the patient complete the activity by him/herself with no assistance (physical, verbal/nonverbal 
cueing, setup/clean-up)? No.



1 STEP CURB - STEP 2:



Does the patient need only setup/clean-up assistance from one helper? Yes.



1. [id number] ADMISSION PERFORMANCE:



Setup or clean-up assistance   



CODE:



05   



4 STEPS:



4 STEPS - STEP 1:



Does the patient complete the activity by him/herself with no assistance (physical, verbal/nonverbal 
cueing, setup/clean-up)? No.



4 STEPS - STEP 2:



Does the patient need only setup/clean-up assistance from one helper? Yes.



1. [id number] ADMISSION PERFORMANCE:



Setup or clean-up assistance   



CODE:



05   



12 STEPS:



12 STEPS - STEP 1:



Does the patient complete the activity by him/herself with no assistance (physical, verbal/nonverbal 
cueing, setup/clean-up)? No.



12 STEPS - STEP 2:



Does the patient need only setup/clean-up assistance from one helper? Yes.



1. [id number] ADMISSION PERFORMANCE:



Setup or clean-up assistance   



CODE:



05  



PICKING UP OBJECT:



PICKING UP OBJECT - STEP 1:



Does the patient complete the activity by him/herself with no assistance (physical, verbal/nonverbal 
cueing, setup/clean-up)? No.



PICKING UP OBJECT - STEP 2:



Does the patient need only setup/clean-up assistance from one helper? Yes.



1. [id number] ADMISSION PERFORMANCE:



Setup or clean-up assistance   



CODE:



05  



DOES THE PATIENT USE A WHEELCHAIR/SCOOTER?



Q1. DOES THE PATIENT USE A WHEELCHAIR/SCOOTER?:



No    



CODE:



0  



INDICATE THE TYPE OF WHEELCHAIR/SCOOTER USED:



CODE:



EXPR  



INDICATE THE TYPE OF WHEELCHAIR/SCOOTER USED:



CODE:



EXPR  



BLADDER AND BOWEL:



CODE:



EXPR  



CODE:



EXPR  



SIGNATURE PANEL:



The following modified sections: 1. BL1240L Admission Performance, 1. [id number] Admission Performance, 
1. [id number] Admission Performance, 1. RM7945Z Admission Performance, 1. EK9680T Admission Performance,
 1. TF0968T Admission Performance, 1. MS5859R Admission Performance, 1. NV0493Q Admission Performance
, 1. IK4907B Admission Performance, 1. FI4939I Admission Performance, 1. UH0527M Admission Performanc
e, 1. RW8057X Admission Performance, 1. QF4173N Admission Performance, Q1. Does the patient use a whe
elchair/scooter? were [electronically] signed by Jase Nicole PTA on Thu Dec 19 2019 15:56:04 GMT-0600
 (Central Standard Time)

## 2019-12-20 RX ADMIN — Medication SCH TAB: at 08:53

## 2019-12-20 RX ADMIN — MAGNESIUM OXIDE TAB 400 MG (241.3 MG ELEMENTAL MG) SCH MG: 400 (241.3 MG) TAB at 08:52

## 2019-12-20 RX ADMIN — Medication SCH ML: at 20:18

## 2019-12-20 RX ADMIN — CEFEPIME SCH MLS: 1 INJECTION, POWDER, FOR SOLUTION INTRAMUSCULAR; INTRAVENOUS at 07:09

## 2019-12-20 RX ADMIN — Medication SCH: at 20:00

## 2019-12-20 RX ADMIN — LACTULOSE SCH GM: 20 SOLUTION ORAL at 08:50

## 2019-12-20 RX ADMIN — SPIRONOLACTONE SCH MG: 25 TABLET, FILM COATED ORAL at 17:33

## 2019-12-20 RX ADMIN — OXYCODONE HYDROCHLORIDE SCH MG: 20 TABLET, FILM COATED, EXTENDED RELEASE ORAL at 08:51

## 2019-12-20 RX ADMIN — PANTOPRAZOLE SODIUM SCH MG: 40 TABLET, DELAYED RELEASE ORAL at 17:32

## 2019-12-20 RX ADMIN — FUROSEMIDE SCH MG: 20 TABLET ORAL at 08:52

## 2019-12-20 RX ADMIN — SPIRONOLACTONE SCH MG: 25 TABLET, FILM COATED ORAL at 08:54

## 2019-12-20 RX ADMIN — OXYCODONE HYDROCHLORIDE PRN MG: 5 TABLET ORAL at 12:49

## 2019-12-20 RX ADMIN — GABAPENTIN SCH MG: 300 CAPSULE ORAL at 20:17

## 2019-12-20 RX ADMIN — FUROSEMIDE SCH MG: 20 TABLET ORAL at 17:32

## 2019-12-20 RX ADMIN — LACTULOSE SCH GM: 20 SOLUTION ORAL at 20:16

## 2019-12-20 RX ADMIN — GABAPENTIN SCH MG: 300 CAPSULE ORAL at 08:52

## 2019-12-20 RX ADMIN — Medication SCH: at 08:00

## 2019-12-20 RX ADMIN — CEFEPIME SCH MLS: 1 INJECTION, POWDER, FOR SOLUTION INTRAMUSCULAR; INTRAVENOUS at 20:15

## 2019-12-20 RX ADMIN — Medication SCH ML: at 07:09

## 2019-12-20 RX ADMIN — PANTOPRAZOLE SODIUM SCH MG: 40 TABLET, DELAYED RELEASE ORAL at 08:53

## 2019-12-20 RX ADMIN — IRON SUPPLEMENT SCH MG: 325 TABLET ORAL at 08:52

## 2019-12-20 RX ADMIN — MAGNESIUM OXIDE TAB 400 MG (241.3 MG ELEMENTAL MG) SCH MG: 400 (241.3 MG) TAB at 20:17

## 2019-12-20 RX ADMIN — FOLIC ACID-PYRIDOXINE-CYANOCOBALAMIN TAB 2.5-25-2 MG SCH TAB: 2.5-25-2 TAB at 08:53

## 2019-12-20 RX ADMIN — Medication SCH ML: at 08:54

## 2019-12-20 RX ADMIN — OXYCODONE HYDROCHLORIDE SCH MG: 20 TABLET, FILM COATED, EXTENDED RELEASE ORAL at 20:17

## 2019-12-20 RX ADMIN — OXYCODONE HYDROCHLORIDE PRN MG: 5 TABLET ORAL at 02:03

## 2019-12-20 NOTE — P.RH.PN
Estimated Length of Stay: 14


Expected Discharge Date: 12/24/19


Discharge Disposition Plan: Home


Family Support: Yes


Long Term Goal: Mobility, Transfers, Self Care


Vital Signs: 


 Last Vital Signs











Temp  99.5 F   12/19/19 20:00


 


Pulse  74   12/20/19 08:54


 


Resp  16   12/20/19 08:51


 


BP  122/67   12/20/19 08:54


 


Pulse Ox  97   12/20/19 08:51











Laboratory: 


 Laboratory Last Values











WBC  1.8 K/uL (4.3-10.9)  L* D 12/19/19  05:59    


 


RBC  2.98 M/uL (4.33-5.43)  L  12/19/19  05:59    


 


Hgb  9.1 g/dL (13.6-17.9)  L  12/19/19  05:59    


 


Hct  26.9 % (39.6-49.0)  L  12/19/19  05:59    


 


MCV  90.5 fL ()   12/19/19  05:59    


 


MCH  30.7 pg (27.0-35.0)   12/19/19  05:59    


 


MCHC  33.9 g/dL (32.0-36.0)   12/19/19  05:59    


 


RDW  19.7 % (12.1-15.2)  H  12/19/19  05:59    


 


Plt Count  90 K/uL (152-406)  L  12/19/19  05:59    


 


MPV  8.7 fL (7.6-11.3)   12/19/19  05:59    


 


Total Counted  Cancelled   12/19/19  05:59    


 


Neutrophils %  74.5 % (41.7-73.7)  H  12/19/19  05:59    


 


Lymphocytes %  9.3 % (15.3-44.8)  L  12/19/19  05:59    


 


Monocytes %  10.6 % (3.3-12.3)   12/19/19  05:59    


 


Eosinophils %  4.9 % (0-4.4)  H  12/19/19  05:59    


 


Basophils %  0.7 % (0-1.3)   12/19/19  05:59    


 


Megakaryocytes %  Cancelled   12/19/19  05:59    


 


Absolute Neutrophils  1.3 K/uL (1.8-8.0)  L  12/19/19  05:59    


 


Segmented Neutrophils  Cancelled   12/19/19  05:59    


 


Band Neutrophils  Cancelled   12/19/19  05:59    


 


Absolute Lymphocytes  0.2 K/uL (0.7-4.9)  L  12/19/19  05:59    


 


Lymphocytes  Cancelled   12/19/19  05:59    


 


Monocytes  Cancelled   12/19/19  05:59    


 


Absolute Monocytes  0.2 K/uL (0.1-1.3)   12/19/19  05:59    


 


Eosinophils  Cancelled   12/19/19  05:59    


 


Absolute Eosinophils  0.1 K/uL (0-0.5)   12/19/19  05:59    


 


Basophils  Cancelled   12/19/19  05:59    


 


Absolute Basophils  0.0 K/uL (0-0.5)   12/19/19  05:59    


 


Metamyelocytes  Cancelled   12/19/19  05:59    


 


Myelocytes  Cancelled   12/19/19  05:59    


 


Promyelocytes  Cancelled   12/19/19  05:59    


 


Nucleated RBCs  Cancelled   12/19/19  05:59    


 


Hypersegmented Neuts  Cancelled   12/19/19  05:59    


 


Hypogranular Neuts  Cancelled   12/19/19  05:59    


 


Atypical Lymphocytes  Cancelled   12/19/19  05:59    


 


Reactive Lymphocytes  Cancelled   12/19/19  05:59    


 


Lymphoblasts  Cancelled   12/19/19  05:59    


 


Blast Cells  Cancelled   12/19/19  05:59    


 


Immature Blood Cells  Cancelled   12/19/19  05:59    


 


Plasma Cells  Cancelled   12/19/19  05:59    


 


Smudge Cells  Cancelled   12/19/19  05:59    


 


Toxic Granulation  Cancelled   12/19/19  05:59    


 


Dohle Bodies  Cancelled   12/19/19  05:59    


 


Pelger-Huet Cells  Cancelled   12/19/19  05:59    


 


Javid Rods  Cancelled   12/19/19  05:59    


 


Platelet Estimate  Cancelled   12/19/19  05:59    


 


Clumped Platelets  Cancelled   12/19/19  05:59    


 


Giant Platelets  Cancelled   12/19/19  05:59    


 


Polychromasia  Cancelled   12/19/19  05:59    


 


Poikilocytosis  1+   12/12/19  05:40    


 


Hypochromasia  Cancelled   12/19/19  05:59    


 


Anisocytosis  1+   12/12/19  05:40    


 


Basophilic Stippling  Cancelled   12/19/19  05:59    


 


Microcytosis  Cancelled   12/19/19  05:59    


 


Macrocytosis  Cancelled   12/19/19  05:59    


 


Spherocytes  Cancelled   12/19/19  05:59    


 


Sickle Cells  Cancelled   12/19/19  05:59    


 


Target Cells  Cancelled   12/19/19  05:59    


 


Tear Drop Cells  Cancelled   12/19/19  05:59    


 


Ovalocytes  Cancelled   12/19/19  05:59    


 


Stomatocytes  Cancelled   12/19/19  05:59    


 


Ibrahim-Jolly Bodies  Cancelled   12/19/19  05:59    


 


Fresno Cells  Cancelled   12/19/19  05:59    


 


Elliptocytes  Cancelled   12/19/19  05:59    


 


Rouleaux  Cancelled   12/19/19  05:59    


 


Cold Agglutinates  Cancelled   12/19/19  05:59    


 


Unidentified Cells  Cancelled   12/19/19  05:59    


 


Schistocytes  Cancelled   12/19/19  05:59    


 


Morphology Comment  Noted  (NOT SEEN)   12/12/19  05:40    


 


Sodium  135 mmol/L (136-145)  L  12/19/19  05:59    


 


Potassium  4.6 mmol/L (3.5-5.1)   12/19/19  05:59    


 


Chloride  105 mmol/L ()   12/19/19  05:59    


 


Carbon Dioxide  23 mmol/L (21-32)   12/19/19  05:59    


 


BUN  22 mg/dL (7-18)  H  12/19/19  05:59    


 


Creatinine  1.78 mg/dL (0.55-1.3)  H  12/19/19  05:59    


 


Estimated GFR  39 mL/min (=/>90)  L  12/19/19  05:59    


 


Glucose  167 mg/dL ()  H  12/19/19  05:59    


 


Calcium  8.0 mg/dL (8.5-10.1)  L  12/19/19  05:59    


 


Magnesium  1.8 mg/dL (1.8-2.4)   12/19/19  05:59    


 


Ammonia  20 umol/L (19-54)   12/13/19  05:40    


 


Albumin  2.9 g/dL (3.4-5.0)  L  12/19/19  05:59    


 


Prealbumin  5.4 mg/dL (20-40)  L  12/19/19  05:59    


 


Urine Color  Dk yellow   12/11/19  22:50    


 


Urine Appearance  Clear   12/11/19  22:50    


 


Urine pH  5.5  (5.0-7.0)   12/11/19  22:50    


 


Ur Specific Gravity  1.015  (1.005-1.030)   12/11/19  22:50    


 


Urine Ketones  Trace  (NEG)   12/11/19  22:50    


 


Urine Blood  Negative  (NEG)   12/11/19  22:50    


 


Urine Nitrite  Negative  (NEG)   12/11/19  22:50    


 


Urine Bilirubin  Negative  (NEG)   12/11/19  22:50    


 


Urine Urobilinogen  0.2 mg/dL (0.2-1.0)   12/11/19  22:50    


 


Ur Leukocyte Esterase  Negative  (NEG)   12/11/19  22:50    


 


Urine RBC  None seen /HPF (NONE SEEN)   12/11/19  22:50    


 


Urine WBC  <5 /HPF (<5)   12/11/19  22:50    


 


Ur Squamous Epith Cells  <5 /HPF (NONE SEEN)   12/11/19  22:50    


 


Ur Urothelial Cells  <5 /HPF (NONE SEEN)   12/11/19  22:50    


 


Urine Bacteria  <20 /HPF (NONE SEEN)   12/11/19  22:50    


 


Urine Mucus  Slight /HPF (NONE SEEN)   12/11/19  22:50    


 


Urine Culture Reflexed  Not needed   12/11/19  22:50    


 


Urine Glucose  Negative  (NEG)   12/11/19  22:50    


 


Urine Total Protein  2+  (NEG)  H  12/11/19  22:50    











Weight: 239 lb 8 oz


Wound Present: No


Closed Surgical Incision Present: No


Negative Pressure Wound Therapy Present: No


Physician Update: He is doing well with physical and occupational theapy. His 

mood is much better.


Medical Issues: Patient is always continent with bladder and bowel.


Functional Improvement: Patient has met all short-term goals at this time and 

is progressing well toward long-term goals.  Patient is presenting well w/ good 

technique and overall safety awareness.


Summary: Patient's care plan and long term goals have been reviewed and revised 

as necessary. Please see the Rehabilitation Signature page for all necessary 

signatures.

## 2019-12-20 NOTE — P.RH.PN
Estimated Length of Stay: 14


Expected Discharge Date: 12/24/19


Discharge Disposition Plan: Home


Family Support: Yes


Long Term Goal: Mobility, Transfers, Self Care


Vital Signs: 


 Last Vital Signs











Temp  99.5 F   12/19/19 20:00


 


Pulse  74   12/20/19 08:54


 


Resp  16   12/20/19 08:51


 


BP  122/67   12/20/19 08:54


 


Pulse Ox  97   12/20/19 08:51











Laboratory: 


 Laboratory Last Values











WBC  1.8 K/uL (4.3-10.9)  L* D 12/19/19  05:59    


 


RBC  2.98 M/uL (4.33-5.43)  L  12/19/19  05:59    


 


Hgb  9.1 g/dL (13.6-17.9)  L  12/19/19  05:59    


 


Hct  26.9 % (39.6-49.0)  L  12/19/19  05:59    


 


MCV  90.5 fL ()   12/19/19  05:59    


 


MCH  30.7 pg (27.0-35.0)   12/19/19  05:59    


 


MCHC  33.9 g/dL (32.0-36.0)   12/19/19  05:59    


 


RDW  19.7 % (12.1-15.2)  H  12/19/19  05:59    


 


Plt Count  90 K/uL (152-406)  L  12/19/19  05:59    


 


MPV  8.7 fL (7.6-11.3)   12/19/19  05:59    


 


Total Counted  Cancelled   12/19/19  05:59    


 


Neutrophils %  74.5 % (41.7-73.7)  H  12/19/19  05:59    


 


Lymphocytes %  9.3 % (15.3-44.8)  L  12/19/19  05:59    


 


Monocytes %  10.6 % (3.3-12.3)   12/19/19  05:59    


 


Eosinophils %  4.9 % (0-4.4)  H  12/19/19  05:59    


 


Basophils %  0.7 % (0-1.3)   12/19/19  05:59    


 


Megakaryocytes %  Cancelled   12/19/19  05:59    


 


Absolute Neutrophils  1.3 K/uL (1.8-8.0)  L  12/19/19  05:59    


 


Segmented Neutrophils  Cancelled   12/19/19  05:59    


 


Band Neutrophils  Cancelled   12/19/19  05:59    


 


Absolute Lymphocytes  0.2 K/uL (0.7-4.9)  L  12/19/19  05:59    


 


Lymphocytes  Cancelled   12/19/19  05:59    


 


Monocytes  Cancelled   12/19/19  05:59    


 


Absolute Monocytes  0.2 K/uL (0.1-1.3)   12/19/19  05:59    


 


Eosinophils  Cancelled   12/19/19  05:59    


 


Absolute Eosinophils  0.1 K/uL (0-0.5)   12/19/19  05:59    


 


Basophils  Cancelled   12/19/19  05:59    


 


Absolute Basophils  0.0 K/uL (0-0.5)   12/19/19  05:59    


 


Metamyelocytes  Cancelled   12/19/19  05:59    


 


Myelocytes  Cancelled   12/19/19  05:59    


 


Promyelocytes  Cancelled   12/19/19  05:59    


 


Nucleated RBCs  Cancelled   12/19/19  05:59    


 


Hypersegmented Neuts  Cancelled   12/19/19  05:59    


 


Hypogranular Neuts  Cancelled   12/19/19  05:59    


 


Atypical Lymphocytes  Cancelled   12/19/19  05:59    


 


Reactive Lymphocytes  Cancelled   12/19/19  05:59    


 


Lymphoblasts  Cancelled   12/19/19  05:59    


 


Blast Cells  Cancelled   12/19/19  05:59    


 


Immature Blood Cells  Cancelled   12/19/19  05:59    


 


Plasma Cells  Cancelled   12/19/19  05:59    


 


Smudge Cells  Cancelled   12/19/19  05:59    


 


Toxic Granulation  Cancelled   12/19/19  05:59    


 


Dohle Bodies  Cancelled   12/19/19  05:59    


 


Pelger-Huet Cells  Cancelled   12/19/19  05:59    


 


Javid Rods  Cancelled   12/19/19  05:59    


 


Platelet Estimate  Cancelled   12/19/19  05:59    


 


Clumped Platelets  Cancelled   12/19/19  05:59    


 


Giant Platelets  Cancelled   12/19/19  05:59    


 


Polychromasia  Cancelled   12/19/19  05:59    


 


Poikilocytosis  1+   12/12/19  05:40    


 


Hypochromasia  Cancelled   12/19/19  05:59    


 


Anisocytosis  1+   12/12/19  05:40    


 


Basophilic Stippling  Cancelled   12/19/19  05:59    


 


Microcytosis  Cancelled   12/19/19  05:59    


 


Macrocytosis  Cancelled   12/19/19  05:59    


 


Spherocytes  Cancelled   12/19/19  05:59    


 


Sickle Cells  Cancelled   12/19/19  05:59    


 


Target Cells  Cancelled   12/19/19  05:59    


 


Tear Drop Cells  Cancelled   12/19/19  05:59    


 


Ovalocytes  Cancelled   12/19/19  05:59    


 


Stomatocytes  Cancelled   12/19/19  05:59    


 


Ibrahim-Jolly Bodies  Cancelled   12/19/19  05:59    


 


[place] Cells  Cancelled   12/19/19  05:59    


 


Elliptocytes  Cancelled   12/19/19  05:59    


 


Rouleaux  Cancelled   12/19/19  05:59    


 


Cold Agglutinates  Cancelled   12/19/19  05:59    


 


Unidentified Cells  Cancelled   12/19/19  05:59    


 


Schistocytes  Cancelled   12/19/19  05:59    


 


Morphology Comment  Noted  (NOT SEEN)   12/12/19  05:40    


 


Sodium  135 mmol/L (136-145)  L  12/19/19  05:59    


 


Potassium  4.6 mmol/L (3.5-5.1)   12/19/19  05:59    


 


Chloride  105 mmol/L ()   12/19/19  05:59    


 


Carbon Dioxide  23 mmol/L (21-32)   12/19/19  05:59    


 


BUN  22 mg/dL (7-18)  H  12/19/19  05:59    


 


Creatinine  1.78 mg/dL (0.55-1.3)  H  12/19/19  05:59    


 


Estimated GFR  39 mL/min (=/>90)  L  12/19/19  05:59    


 


Glucose  167 mg/dL ()  H  12/19/19  05:59    


 


Calcium  8.0 mg/dL (8.5-10.1)  L  12/19/19  05:59    


 


Magnesium  1.8 mg/dL (1.8-2.4)   12/19/19  05:59    


 


Ammonia  20 umol/L (19-54)   12/13/19  05:40    


 


Albumin  2.9 g/dL (3.4-5.0)  L  12/19/19  05:59    


 


Prealbumin  5.4 mg/dL (20-40)  L  12/19/19  05:59    


 


Urine Color  Dk yellow   12/11/19  22:50    


 


Urine Appearance  Clear   12/11/19  22:50    


 


Urine pH  5.5  (5.0-7.0)   12/11/19  22:50    


 


Ur Specific Gravity  1.015  (1.005-1.030)   12/11/19  22:50    


 


Urine Ketones  Trace  (NEG)   12/11/19  22:50    


 


Urine Blood  Negative  (NEG)   12/11/19  22:50    


 


Urine Nitrite  Negative  (NEG)   12/11/19  22:50    


 


Urine Bilirubin  Negative  (NEG)   12/11/19  22:50    


 


Urine Urobilinogen  0.2 mg/dL (0.2-1.0)   12/11/19  22:50    


 


Ur Leukocyte Esterase  Negative  (NEG)   12/11/19  22:50    


 


Urine RBC  None seen /HPF (NONE SEEN)   12/11/19  22:50    


 


Urine WBC  <5 /HPF (<5)   12/11/19  22:50    


 


Ur Squamous Epith Cells  <5 /HPF (NONE SEEN)   12/11/19  22:50    


 


Ur Urothelial Cells  <5 /HPF (NONE SEEN)   12/11/19  22:50    


 


Urine Bacteria  <20 /HPF (NONE SEEN)   12/11/19  22:50    


 


Urine Mucus  Slight /HPF (NONE SEEN)   12/11/19  22:50    


 


Urine Culture Reflexed  Not needed   12/11/19  22:50    


 


Urine Glucose  Negative  (NEG)   12/11/19  22:50    


 


Urine Total Protein  2+  (NEG)  H  12/11/19  22:50    











Weight: 239 lb 8 oz


Wound Present: No


Closed Surgical Incision Present: No


Negative Pressure Wound Therapy Present: No


Physician Update: Labs reviewed and are stable. His mood is better. He is 

participating well with physical and occupational therapy.


Medical Issues: Patient is always continent with bladder and bowel.


Functional Improvement: Patient has met all short-term goals at this time and 

is progressing well toward long-term goals.  Patient is presenting well w/ good 

technique and overall safety awareness.


Summary: Patient's care plan and long term goals have been reviewed and revised 

as necessary. Please see the Rehabilitation Signature page for all necessary 

signatures.

## 2019-12-21 RX ADMIN — PANTOPRAZOLE SODIUM SCH MG: 40 TABLET, DELAYED RELEASE ORAL at 08:22

## 2019-12-21 RX ADMIN — SPIRONOLACTONE SCH MG: 25 TABLET, FILM COATED ORAL at 17:05

## 2019-12-21 RX ADMIN — LACTULOSE SCH GM: 20 SOLUTION ORAL at 08:22

## 2019-12-21 RX ADMIN — OXYCODONE HYDROCHLORIDE SCH MG: 20 TABLET, FILM COATED, EXTENDED RELEASE ORAL at 21:19

## 2019-12-21 RX ADMIN — MAGNESIUM OXIDE TAB 400 MG (241.3 MG ELEMENTAL MG) SCH MG: 400 (241.3 MG) TAB at 08:24

## 2019-12-21 RX ADMIN — IRON SUPPLEMENT SCH MG: 325 TABLET ORAL at 08:24

## 2019-12-21 RX ADMIN — Medication SCH TAB: at 08:24

## 2019-12-21 RX ADMIN — LACTULOSE SCH GM: 20 SOLUTION ORAL at 21:18

## 2019-12-21 RX ADMIN — OXYCODONE HYDROCHLORIDE SCH MG: 20 TABLET, FILM COATED, EXTENDED RELEASE ORAL at 08:24

## 2019-12-21 RX ADMIN — ONDANSETRON PRN MG: 4 TABLET, ORALLY DISINTEGRATING ORAL at 18:19

## 2019-12-21 RX ADMIN — Medication SCH: at 08:00

## 2019-12-21 RX ADMIN — GABAPENTIN SCH MG: 300 CAPSULE ORAL at 08:24

## 2019-12-21 RX ADMIN — Medication SCH: at 20:00

## 2019-12-21 RX ADMIN — SPIRONOLACTONE SCH MG: 25 TABLET, FILM COATED ORAL at 08:22

## 2019-12-21 RX ADMIN — FUROSEMIDE SCH MG: 20 TABLET ORAL at 08:25

## 2019-12-21 RX ADMIN — OXYCODONE HYDROCHLORIDE PRN MG: 5 TABLET ORAL at 12:08

## 2019-12-21 RX ADMIN — OXYCODONE HYDROCHLORIDE PRN MG: 5 TABLET ORAL at 01:56

## 2019-12-21 RX ADMIN — MAGNESIUM OXIDE TAB 400 MG (241.3 MG ELEMENTAL MG) SCH MG: 400 (241.3 MG) TAB at 21:19

## 2019-12-21 RX ADMIN — PANTOPRAZOLE SODIUM SCH MG: 40 TABLET, DELAYED RELEASE ORAL at 17:05

## 2019-12-21 RX ADMIN — FUROSEMIDE SCH MG: 20 TABLET ORAL at 17:05

## 2019-12-21 RX ADMIN — OXYCODONE HYDROCHLORIDE PRN MG: 5 TABLET ORAL at 18:16

## 2019-12-21 RX ADMIN — GABAPENTIN SCH MG: 300 CAPSULE ORAL at 21:19

## 2019-12-21 RX ADMIN — FOLIC ACID-PYRIDOXINE-CYANOCOBALAMIN TAB 2.5-25-2 MG SCH TAB: 2.5-25-2 TAB at 08:24

## 2019-12-21 NOTE — FAST
SHIFT START DATE/TIME:



12/20/2019 19:00 (CST)



SHIFT END DATE/TIME:



12/21/2019 07:00 (CST)



NAME



JONE LUU



YOB: 1955



DATE OF ADMISSION:



12/11/2019 22:04 (CST)



PHONE:



[phone]



AGE:



64



SSN#



[id number]



GENDER:



Male



ENCOUNTER PHYSICIAN:



Dr. Fareed Nagel M.D.



ADMISSION DIAGNOSIS:



- Debility 16 -  Debility (16)

Decompensated Liver Cirrhosis . 



EATING:



Not assessed/no information 



CODE:



-  



ORAL HYGIENE:



Not assessed/no information 



CODE:



-  



TOILETING HYGIENE:



TOILETING HYGIENE - STEP 1:



Does the patient complete the activity by him/herself with no assistance (physical, verbal/nonverbal 
cueing, setup/clean-up)? No.



TOILETING HYGIENE - STEP 2:



Does the patient need only setup/clean-up assistance from one helper? No.



TOILETING HYGIENE - STEP 3:



Does the patient need only verbal/nonverbal cueing or touching/steadying/contact guard assistance fro
m one helper? Yes.



1. [id number] ADMISSION PERFORMANCE:



Supervision or touching assistance   



CODE:



04  



BATHING:



Not assessed/no information 



CODE:



-  



DRESSING - UPPER BODY:



Not assessed/no information 



CODE:



-  



DRESSING - LOWER BODY:



Not assessed/no information 



CODE:



-  



PUTTING ON/TAKING OFF FOOTWEAR:



Not assessed/no information 



CODE:



-  



ROLL LEFT AND RIGHT:



ROLL LEFT AND RIGHT - STEP 1:



Does the patient complete the activity by him/herself with no assistance (physical, verbal/nonverbal 
cueing, setup/clean-up)? No.



ROLL LEFT AND RIGHT - STEP 2:



Does the patient need only setup/clean-up assistance from one helper? No.



ROLL LEFT AND RIGHT - STEP 3:



Does the patient need only verbal/nonverbal cueing or touching/steadying/contact guard assistance fro
m one helper? Yes.



1. [id number] ADMISSION PERFORMANCE:



Supervision or touching assistance   



CODE:



04  



SIT TO LYING:



SIT TO LYING - STEP 1:



Does the patient complete the activity by him/herself with no assistance (physical, verbal/nonverbal 
cueing, setup/clean-up)? No.



SIT TO LYING - STEP 2:



Does the patient need only setup/clean-up assistance from one helper? No.



SIT TO LYING - STEP 3:



Does the patient need only verbal/nonverbal cueing or touching/steadying/contact guard assistance fro
m one helper? Yes.



1. [id number] ADMISSION PERFORMANCE:



Supervision or touching assistance   



CODE:



04  



LYING TO SITTING:



LYING TO SITTING ON SIDE OF BED - STEP 1:



Does the patient complete the activity by him/herself with no assistance (physical, verbal/nonverbal 
cueing, setup/clean-up)? No.



LYING TO SITTING ON SIDE OF BED - STEP 2:



Does the patient need only setup/clean-up assistance from one helper? No.



LYING TO SITTING ON SIDE OF BED - STEP 3:



Does the patient need only verbal/nonverbal cueing or touching/steadying/contact guard assistance fro
m one helper? Yes.



1. [id number] ADMISSION PERFORMANCE:



Supervision or touching assistance   



CODE:



04  



SIT TO STAND:



SIT TO STAND - STEP 1:



Does the patient complete the activity by him/herself with no assistance (physical, verbal/nonverbal 
cueing, setup/clean-up)? No.



SIT TO STAND - STEP 2:



Does the patient need only setup/clean-up assistance from one helper? No.



SIT TO STAND - STEP 3:



Does the patient need only verbal/nonverbal cueing or touching/steadying/contact guard assistance fro
m one helper? Yes.



1. [id number] ADMISSION PERFORMANCE:



Supervision or touching assistance   



CODE:



04  



TRANSFERS: BED, CHAIR:



CHAIR/BED-TO-CHAIR TRANSFER - STEP 1:



Does the patient complete the activity by him/herself with no assistance (physical, verbal/nonverbal 
cueing, setup/clean-up)? No.



CHAIR/BED-TO-CHAIR TRANSFER - STEP 2:



Does the patient need only setup/clean-up assistance from one helper? No.



CHAIR/BED-TO-CHAIR TRANSFER - STEP 3:



Does the patient need only verbal/nonverbal cueing or touching/steadying/contact guard assistance fro
m one helper? Yes.



1. [id number] ADMISSION PERFORMANCE:



Supervision or touching assistance   



CODE:



04  



TRANSFER TOILET:



Not assessed/no information 



CODE:



-  



TRANSFERS: CAR:



Not assessed/no information 



CODE:



-  



WALK 10 FEET:



Not assessed/no information 



CODE:



-   



1 STEP (CURB):



Not assessed/no information 



CODE:



-   



PICKING UP OBJECT:



Not assessed/no information 



CODE:



-  



DOES THE PATIENT USE A WHEELCHAIR/SCOOTER?



CODE:



EXPR  



WHEEL 50 FEET WITH TWO TURNS:



Not assessed/no information 



CODE:



-  



INDICATE THE TYPE OF WHEELCHAIR/SCOOTER USED:



CODE:



EXPR  



WHEEL 150 FEET:



Not assessed/no information 



CODE:



-  



INDICATE THE TYPE OF WHEELCHAIR/SCOOTER USED:



CODE:



EXPR  



BLADDER AND BOWEL:



H350. BLADDER CONTINENCE (3-DAY ASSESSMENT PERIOD):



Always continent (no documented incontinence)   



CODE:



0  



H400. BOWEL CONTINENCE (3-DAY ASSESSMENT PERIOD):



Always continent   



CODE:



0

## 2019-12-21 NOTE — FAST
SHIFT START DATE/TIME:



12/21/2019 07:00 (CST)



SHIFT END DATE/TIME:



12/21/2019 19:00 (CST)



NAME



JONE LUU



YOB: 1955



DATE OF ADMISSION:



12/11/2019 22:04 (CST)



PHONE:



[phone]



AGE:



64



SSN#



[id number]



GENDER:



Male



ENCOUNTER PHYSICIAN:



Dr. Fareed Nagel M.D.



ADMISSION DIAGNOSIS:



- Debility 16 -  Debility (16)

Decompensated Liver Cirrhosis . 



EATING:



EATING - STEP 1:



Does the patient complete the activity by him/herself with no assistance (physical, verbal/nonverbal 
cueing, setup/clean-up)? No.



EATING - STEP 2:



Does the patient need only setup/clean-up assistance from one helper? Yes.



1. [id number] ADMISSION PERFORMANCE:



Setup or clean-up assistance   



CODE:



05  



ORAL HYGIENE:



ORAL HYGIENE - STEP 1:



Does the patient complete the activity by him/herself with no assistance (physical, verbal/nonverbal 
cueing, setup/clean-up)? Yes.



1. [id number] ADMISSION PERFORMANCE:



Independent   



CODE:



06  



TOILETING HYGIENE:



TOILETING HYGIENE - STEP 1:



Does the patient complete the activity by him/herself with no assistance (physical, verbal/nonverbal 
cueing, setup/clean-up)? No.



TOILETING HYGIENE - STEP 2:



Does the patient need only setup/clean-up assistance from one helper? Yes.



1. [id number] ADMISSION PERFORMANCE:



Setup or clean-up assistance   



CODE:



05  



BATHING:



Not assessed/no information 



CODE:



-  



DRESSING - UPPER BODY:



DRESSING - UPPER BODY - STEP 1:



Does the patient complete the activity by him/herself with no assistance (physical, verbal/nonverbal 
cueing, setup/clean-up)? No.



DRESSING - UPPER BODY - STEP 2:



Does the patient need only setup/clean-up assistance from one helper? Yes.



1. [id number] ADMISSION PERFORMANCE:



Setup or clean-up assistance   



CODE:



05  



DRESSING - LOWER BODY:



DRESSING - LOWER BODY - STEP 1:



Does the patient complete the activity by him/herself with no assistance (physical, verbal/nonverbal 
cueing, setup/clean-up)? No.



DRESSING - LOWER BODY - STEP 2:



Does the patient need only setup/clean-up assistance from one helper? Yes.



1. [id number] ADMISSION PERFORMANCE:



Setup or clean-up assistance   



CODE:



05  



PUTTING ON/TAKING OFF FOOTWEAR:



FOOTWEAR - STEP 1:



Does the patient complete the activity by him/herself with no assistance (physical, verbal/nonverbal 
cueing, setup/clean-up)? Yes.



1. [id number] ADMISSION PERFORMANCE:



Independent   



CODE:



06  



ROLL LEFT AND RIGHT:



ROLL LEFT AND RIGHT - STEP 1:



Does the patient complete the activity by him/herself with no assistance (physical, verbal/nonverbal 
cueing, setup/clean-up)? Yes.



1. [id number] ADMISSION PERFORMANCE:



Independent   



CODE:



06  



SIT TO LYING:



SIT TO LYING - STEP 1:



Does the patient complete the activity by him/herself with no assistance (physical, verbal/nonverbal 
cueing, setup/clean-up)? Yes.



1. [id number] ADMISSION PERFORMANCE:



Independent   



CODE:



06  



LYING TO SITTING:



LYING TO SITTING ON SIDE OF BED - STEP 1:



Does the patient complete the activity by him/herself with no assistance (physical, verbal/nonverbal 
cueing, setup/clean-up)? Yes.



1. [id number] ADMISSION PERFORMANCE:



Independent   



CODE:



06  



SIT TO STAND:



SIT TO STAND - STEP 1:



Does the patient complete the activity by him/herself with no assistance (physical, verbal/nonverbal 
cueing, setup/clean-up)? No.



SIT TO STAND - STEP 2:



Does the patient need only setup/clean-up assistance from one helper? No.



SIT TO STAND - STEP 3:



Does the patient need only verbal/nonverbal cueing or touching/steadying/contact guard assistance fro
m one helper? Yes.



1. [id number] ADMISSION PERFORMANCE:



Supervision or touching assistance   



CODE:



04  



TRANSFERS: BED, CHAIR:



CHAIR/BED-TO-CHAIR TRANSFER - STEP 1:



Does the patient complete the activity by him/herself with no assistance (physical, verbal/nonverbal 
cueing, setup/clean-up)? No.



CHAIR/BED-TO-CHAIR TRANSFER - STEP 2:



Does the patient need only setup/clean-up assistance from one helper? No.



CHAIR/BED-TO-CHAIR TRANSFER - STEP 3:



Does the patient need only verbal/nonverbal cueing or touching/steadying/contact guard assistance fro
m one helper? Yes.



1. [id number] ADMISSION PERFORMANCE:



Supervision or touching assistance   



CODE:



04  



TRANSFER TOILET:



TOILET TRANSFER - STEP 1:



Does the patient complete the activity by him/herself with no assistance (physical, verbal/nonverbal 
cueing, setup/clean-up)? Yes.



1. [id number] ADMISSION PERFORMANCE:



Independent   



CODE:



06  



TRANSFERS: CAR:



Not assessed/no information 



CODE:



-  



WALK 50 FEET:



Not assessed/no information 



CODE:



-  



WALK 150 FEET:



Not assessed/no information 



CODE:



-  



WALK 10 FEET UNEVEN:



Not assessed/no information 



CODE:



-  



1 STEP (CURB):



Not assessed/no information 



CODE:



-   



PICKING UP OBJECT:



Not assessed/no information 



CODE:



-  



DOES THE PATIENT USE A WHEELCHAIR/SCOOTER?



Q1. DOES THE PATIENT USE A WHEELCHAIR/SCOOTER?:



Yes    



CODE:



1  



WHEEL 50 FEET WITH TWO TURNS:



Not assessed/no information 



CODE:



-  



INDICATE THE TYPE OF WHEELCHAIR/SCOOTER USED:



RR1. INDICATE THE TYPE OF WHEELCHAIR/SCOOTER USED.:



Manual   



CODE:



1  



WHEEL 150 FEET:



Not assessed/no information 



CODE:



-  



INDICATE THE TYPE OF WHEELCHAIR/SCOOTER USED:



SS1. INDICATE THE TYPE OF WHEELCHAIR/SCOOTER USED.:



Manual   



CODE:



1  



BLADDER AND BOWEL:



H350. BLADDER CONTINENCE (3-DAY ASSESSMENT PERIOD):



Always continent (no documented incontinence)   



CODE:



0  



H400. BOWEL CONTINENCE (3-DAY ASSESSMENT PERIOD):



Always continent   



CODE:



0  



SIGNATURE PANEL:



The following modified sections: 1. [id number] Admission Performance, 1. [id number] Admission Performance, 
1. PL6186D Admission Performance, 1. [id number] Admission Performance, 1. LV4719w Admission Performance,
 1. AB3671C Admission Performance, 1. QC5486L Admission Performance, 1. UL4643K Admission Performance
, 1. OH2605O Admission Performance, 1. [id number] Admission Performance, 1. [id number] Admission Performanc
e, Q1. Does the patient use a wheelchair/scooter?, RR1. Indicate the type of wheelchair/scooter used.
, Code, SS1. Indicate the type of wheelchair/scooter used., H350. Bladder Continence (3-day assessmen
t period), H400. Bowel Continence (3-day assessment period), 1. [id number] Admission Performance were [e
lectronically] signed by Amarilis Cole C.N.A. on Sat Dec 21 2019 16:23:25 GMT-0600 (Central Standard 
Time)

## 2019-12-22 RX ADMIN — Medication SCH: at 07:38

## 2019-12-22 RX ADMIN — PANTOPRAZOLE SODIUM SCH MG: 40 TABLET, DELAYED RELEASE ORAL at 17:07

## 2019-12-22 RX ADMIN — Medication SCH TAB: at 07:36

## 2019-12-22 RX ADMIN — IRON SUPPLEMENT SCH MG: 325 TABLET ORAL at 07:36

## 2019-12-22 RX ADMIN — OXYCODONE HYDROCHLORIDE SCH MG: 20 TABLET, FILM COATED, EXTENDED RELEASE ORAL at 07:35

## 2019-12-22 RX ADMIN — PANTOPRAZOLE SODIUM SCH MG: 40 TABLET, DELAYED RELEASE ORAL at 07:37

## 2019-12-22 RX ADMIN — SPIRONOLACTONE SCH MG: 25 TABLET, FILM COATED ORAL at 07:36

## 2019-12-22 RX ADMIN — GABAPENTIN SCH MG: 300 CAPSULE ORAL at 07:36

## 2019-12-22 RX ADMIN — FOLIC ACID-PYRIDOXINE-CYANOCOBALAMIN TAB 2.5-25-2 MG SCH TAB: 2.5-25-2 TAB at 07:36

## 2019-12-22 RX ADMIN — LACTULOSE SCH GM: 20 SOLUTION ORAL at 21:00

## 2019-12-22 RX ADMIN — FUROSEMIDE SCH MG: 20 TABLET ORAL at 07:37

## 2019-12-22 RX ADMIN — Medication SCH: at 20:00

## 2019-12-22 RX ADMIN — OXYCODONE HYDROCHLORIDE PRN MG: 5 TABLET ORAL at 14:43

## 2019-12-22 RX ADMIN — FUROSEMIDE SCH MG: 20 TABLET ORAL at 17:08

## 2019-12-22 RX ADMIN — GABAPENTIN SCH MG: 300 CAPSULE ORAL at 21:00

## 2019-12-22 RX ADMIN — SPIRONOLACTONE SCH MG: 25 TABLET, FILM COATED ORAL at 17:08

## 2019-12-22 RX ADMIN — ONDANSETRON PRN MG: 4 TABLET, ORALLY DISINTEGRATING ORAL at 21:04

## 2019-12-22 RX ADMIN — OXYCODONE HYDROCHLORIDE SCH MG: 20 TABLET, FILM COATED, EXTENDED RELEASE ORAL at 21:00

## 2019-12-22 RX ADMIN — MAGNESIUM OXIDE TAB 400 MG (241.3 MG ELEMENTAL MG) SCH MG: 400 (241.3 MG) TAB at 07:36

## 2019-12-22 RX ADMIN — MAGNESIUM OXIDE TAB 400 MG (241.3 MG ELEMENTAL MG) SCH MG: 400 (241.3 MG) TAB at 21:00

## 2019-12-22 RX ADMIN — LACTULOSE SCH GM: 20 SOLUTION ORAL at 07:34

## 2019-12-22 RX ADMIN — ONDANSETRON PRN MG: 4 TABLET, ORALLY DISINTEGRATING ORAL at 14:47

## 2019-12-22 RX ADMIN — OXYCODONE HYDROCHLORIDE PRN MG: 5 TABLET ORAL at 07:06

## 2019-12-23 RX ADMIN — OXYCODONE HYDROCHLORIDE SCH MG: 20 TABLET, FILM COATED, EXTENDED RELEASE ORAL at 08:51

## 2019-12-23 RX ADMIN — GABAPENTIN SCH MG: 300 CAPSULE ORAL at 08:50

## 2019-12-23 RX ADMIN — OXYCODONE HYDROCHLORIDE PRN MG: 5 TABLET ORAL at 02:44

## 2019-12-23 RX ADMIN — FOLIC ACID-PYRIDOXINE-CYANOCOBALAMIN TAB 2.5-25-2 MG SCH TAB: 2.5-25-2 TAB at 08:50

## 2019-12-23 RX ADMIN — LACTULOSE SCH GM: 20 SOLUTION ORAL at 08:49

## 2019-12-23 RX ADMIN — MAGNESIUM OXIDE TAB 400 MG (241.3 MG ELEMENTAL MG) SCH MG: 400 (241.3 MG) TAB at 08:51

## 2019-12-23 RX ADMIN — LACTULOSE SCH GM: 20 SOLUTION ORAL at 20:25

## 2019-12-23 RX ADMIN — SPIRONOLACTONE SCH MG: 25 TABLET, FILM COATED ORAL at 17:10

## 2019-12-23 RX ADMIN — IRON SUPPLEMENT SCH MG: 325 TABLET ORAL at 08:50

## 2019-12-23 RX ADMIN — FUROSEMIDE SCH MG: 20 TABLET ORAL at 17:10

## 2019-12-23 RX ADMIN — FUROSEMIDE SCH MG: 20 TABLET ORAL at 08:50

## 2019-12-23 RX ADMIN — PANTOPRAZOLE SODIUM SCH MG: 40 TABLET, DELAYED RELEASE ORAL at 08:49

## 2019-12-23 RX ADMIN — OXYCODONE HYDROCHLORIDE SCH MG: 20 TABLET, FILM COATED, EXTENDED RELEASE ORAL at 20:26

## 2019-12-23 RX ADMIN — Medication SCH TAB: at 08:50

## 2019-12-23 RX ADMIN — Medication SCH: at 08:00

## 2019-12-23 RX ADMIN — ONDANSETRON PRN MG: 4 TABLET, ORALLY DISINTEGRATING ORAL at 20:29

## 2019-12-23 RX ADMIN — PANTOPRAZOLE SODIUM SCH MG: 40 TABLET, DELAYED RELEASE ORAL at 17:11

## 2019-12-23 RX ADMIN — ONDANSETRON PRN MG: 4 TABLET, ORALLY DISINTEGRATING ORAL at 15:11

## 2019-12-23 RX ADMIN — OXYCODONE HYDROCHLORIDE PRN MG: 5 TABLET ORAL at 15:11

## 2019-12-23 RX ADMIN — MAGNESIUM OXIDE TAB 400 MG (241.3 MG ELEMENTAL MG) SCH MG: 400 (241.3 MG) TAB at 20:25

## 2019-12-23 RX ADMIN — MELATONIN PRN MG: 3 TAB ORAL at 20:26

## 2019-12-23 RX ADMIN — SPIRONOLACTONE SCH MG: 25 TABLET, FILM COATED ORAL at 08:52

## 2019-12-23 RX ADMIN — Medication SCH: at 20:00

## 2019-12-23 RX ADMIN — GABAPENTIN SCH MG: 300 CAPSULE ORAL at 20:25

## 2019-12-23 RX ADMIN — ONDANSETRON PRN MG: 4 TABLET, ORALLY DISINTEGRATING ORAL at 08:53

## 2019-12-23 NOTE — R.PN
ENCOUNTER DATE AND TIME:



12/23/2019 18:18 (CST)



NAME



JONE LUU



YOB: 1955



DATE OF ADMISSION:



12/11/2019 22:04 (CST)



Decompensated Liver Cirrhosis CHIEF COMPLAINT:



Debility and liver cirrhosis.



SUBJECTIVE:



Pt denied any depression.

Pt denied any Shortness of Breath.

Ambulated 1250' using a rolling walker with independence. Up and down 15 steps with independence.

His prealbumin is low at 5.4, Crt is high at 1.78. His hydration has been increased. Chronically low 
WBC at 1.8.



VITAL SIGNS



Temperature: 97.4 F

SBP/DBP: 139/77

Pulse: 91

Resp: 16



MEDICATION ALLERGIES:



No Known Drug Allergies (NKDA)



ENVIRONMENTAL ALLERGIES:





Shellfish

- Substance Allergies

None Known

- Other Allergies

None Known



NURSING:



- Shower

allowing shower



ACTIVITIES



OOB only with supervision



THERAPIES:



- Dietary and Nutrition

Adequate Nutrition. Nutritional Education. Nutritional Supplements. 



PHYSICAL EXAM



- Gen

Alert and awake

Lying in bed

No apparent distress

Oriented to: person, time, and place

- Skin

No skin breakdown.



Normacephalic

- Eyes

No abnormalities

- ENMT

No abnormalities

- Neck

No abnormalities



No cervical adenopathy

- CVS

RRR

- Chest

No abnormalities

- Resp

Clear to auscultation

- Abd

Soft

- GI

Non distended



Deferred

- 

No abnormalities

- Ext

Mild bilateral lower extremity edema.

- MSK

4+/5 weakness in both lower extremities.

- Neuro

No focal deficits

- Psych

No abnormalities



ASSESSMENT:



Pt. is a 65 yo Right-handed white male.On 11/29/2019 he was admitted to [facility] with diagnosis Decompensated Liver Cirrhosis .His impairment category is Debility 16 -  Debil
ity (16).Pre-morbidly, Pt. was independent/mod-I in Transfers Control, Safety Awareness, Locomotion, 
Balance, Social Cognition, Sphincter Control, Self-Care, and Endurance; and he had good Safety Awaren
ess.Currently, he has deficits of Locomotion, Balance, Transfers Control, Self-Care, and Endurance.Pt
. is now referred to [facility] for acute in-patient rehabilitation in order t
o maximize patient's functional independence in activities of daily living, strength, ROM, and mobili
ty.- Rehab Goal

Patient has realistic goal of being discharged at assistance level 6-Victorina to reside at Home with  Fam
kayden/Relatives.

MDM/PLAN:



- Physical Therapy

 Gait dysfunction - to improve, our physical therapists will perform initial evaluation of pt's statu
s upon admission and devise an individualized program for Gait Training, and Wheel Chair mobility

 Inability to transfer - to improve, our physical therapists will perform initial evaluation of pt's 
status upon admission and devise an individualized program for Bed mobility

 Need for home safety evaluation - to improve, our physical therapists will perform initial evaluatio
n of pt's status upon admission and devise an individualized program for Home Evaluation

 Need in caregiver upon discharge - to improve, our physical therapists will perform initial evaluati
on of pt's status upon admission and devise an individualized program for Caregiver Training

 Edema - to improve, our physical therapists will perform initial evaluation of pt's status upon admi
ssion and devise an individualized program for Elevation Training, and Lymphedema Therapy

 New precaution - to improve, our physical therapists will perform initial evaluation of pt's status 
upon admission and devise an individualized program for Patient precaution education

 Poor balance - to improve, our physical therapists will perform initial evaluation of pt's status up
on admission and devise an individualized program for Balance Training

 Poor endurance - to improve, our physical therapists will perform initial evaluation of pt's status 
upon admission and devise an individualized program for Endurance Training

 Weakness - to improve, our physical therapists will perform initial evaluation of pt's status upon a
dmission and devise an individualized program for Aquatic Therapy, Neuromuscular Reeducation, and Str
engthening

 Achieving independence - to improve, our physical therapists will perform initial evaluation of pt's
 status upon admission and devise an individualized program for Community Reintegration Activities

- Occupational Therapy

 ADL deficits - to improve, our occupation therapists will perform initial evaluation of pt's status 
upon admission and devise an individualized program for Bathing, Bed mobility, Community Reintegratio
n, Cooking, Dressing, Eating, Fine Motor Skills, Grooming, Homemaking, Kitchen Mobility, Laundry, Pat
ient Education, Safety Awareness, Splinting - Positioning, Transfers(Toilet, Tub, Shower), and Wheel 
Chair Management

 Need for care giver - to improve, our occupation therapists will perform initial evaluation of pt's 
status upon admission and devise an individualized program for Caregiver Training

 Weakness - to improve, our occupation therapists will perform initial evaluation of pt's status upon
 admission and devise an individualized program for Aquatic Therapy, Balance, Endurance, UE ROM, and 
UE strengthening

- Other

 See attached MAR (Medication Administration Record)

- Diet Type

Continue Regular

- Diet - Liquid Texture

Continue Regular

- Tube Feed

Continue N/A

- Diet - Solid Texture

Continue Regular

- Shower

 allowing shower



FUNCTIONAL STATUS: UPDATED AT WEEKLY TEAM CONFERENCE



- Bladder

Same accident frequency: 7-Ind - No accidents in the past 7 days

- Bowel

Same accident frequency: 7-Ind - No accidents in the past 7 days

- Walking

Same score based on distance walked: 0(N/A)

- Wheelchair

Same score based on distance traveled: 0(N/A)



FUNCTIONAL STATUS:



- Self-Care

A. Eating    Ind   

B. Grooming    Ind   

C. Bathing    Ilene   

D. Dressing - Upper     sup   

E. Dressing - Lower     Ilene   

F. Toileting    Ilene   

- Sphincter Control

G. Bladder control     Ilene   

H. Bowel control     Ilene   

- Transfers Control

I. Bed/Chair/Wheelchair     sup   

J. Toilet    Ilene   

K. Tub/Shower    Ilene   

- Locomotion

L. Walk/Wheelchair (B)     Ilene   

M. Stairs    maxA   

- Communication

N. Comprehension (B)     sup   

O. Expression (B)     sup   

- Social Cognition

P. Social Interaction    Ind   

Q. Problem Solving    Ind   

R. Memory    sup   

- Endurance

Good

- Balance

Good

- Safety Awareness

Good



QI SCORES:



- Self-Care

A. Eating  04-Supervision or touching assistance  

B. Oral hygiene  04-Supervision or touching assistance  

C. Toileting hygiene  03-Partial/moderate assistance  

E. Shower/bathe self  03-Partial/moderate assistance  

F. Upper body dressing  03-Partial/moderate assistance  

G. Lower body dressing  03-Partial/moderate assistance  

H. Putting on/taking off footwear  03-Partial/moderate assistance  

- Mobility

A. Roll left and right  03-Partial/moderate assistance  

B. Sit to lying  03-Partial/moderate assistance  

C. Lying to sitting on side of bed  88-Not attempted due to medical condition or safety concerns  

D. Sit to stand  03-Partial/moderate assistance  

E. Chair/bed-to-chair transfer  03-Partial/moderate assistance  

F. Toilet transfer  03-Partial/moderate assistance  

G. Car transfer  88-Not attempted due to medical condition or safety concerns  

I. Walk 10 feet  03-Partial/moderate assistance  

J. Walk 50 feet with two turns  88-Not attempted due to medical condition or safety concerns  

K. Walk 150 feet  88-Not attempted due to medical condition or safety concerns  

L. Walking 10 feet on uneven surfaces  88-Not attempted due to medical condition or safety concerns  


M. 1 step (curb)  88-Not attempted due to medical condition or safety concerns  

N. 4 steps  88-Not attempted due to medical condition or safety concerns  

O. 12 steps  88-Not attempted due to medical condition or safety concerns  

P. Picking up object  88-Not attempted due to medical condition or safety concerns  

R. Wheel 50 feet with two turns  88-Not attempted due to medical condition or safety concerns  

S. Wheel 150 feet  88-Not attempted due to medical condition or safety concerns  

- Bladder and Bowel

Bladder continence  0-Always continent  

Bowel continence  0-Always continent  

- Endurance

Fair

- Balance

Fair

- Safety Awareness

Fair



CURRENT FUNC. DEFICITS:



Self-Care, Mobility, Endurance, Balance, and Safety Awareness



SIGNATURE PANEL:



Electronically signed by Dr. Fareed Nagel M.D. on 12/23/2019 at 18:22 (CST)

## 2019-12-23 NOTE — FAST
SHIFT START DATE/TIME:



12/23/2019 07:00 (CST)



SHIFT END DATE/TIME:



12/23/2019 19:00 (CST)



NAME



JONE LUU



YOB: 1955



DATE OF ADMISSION:



12/11/2019 22:04 (CST)



PHONE:



[phone]



AGE:



64



SSN#



[id number]



GENDER:



Male



ENCOUNTER PHYSICIAN:



Dr. Fareed Nagel M.D.



ADMISSION DIAGNOSIS:



- Debility 16 -  Debility (16)

Decompensated Liver Cirrhosis . 



EATING:



EATING - STEP 1:



Does the patient complete the activity by him/herself with no assistance (physical, verbal/nonverbal 
cueing, setup/clean-up)? No.



EATING - STEP 2:



Does the patient need only setup/clean-up assistance from one helper? No.



EATING - STEP 3:



Does the patient need only verbal/nonverbal cueing or touching/steadying/contact guard assistance fro
m one helper? Yes.



1. [id number] ADMISSION PERFORMANCE:



Supervision or touching assistance   



CODE:



04  



ORAL HYGIENE:



ORAL HYGIENE - STEP 1:



Does the patient complete the activity by him/herself with no assistance (physical, verbal/nonverbal 
cueing, setup/clean-up)? No.



ORAL HYGIENE - STEP 2:



Does the patient need only setup/clean-up assistance from one helper? No.



ORAL HYGIENE - STEP 3:



Does the patient need only verbal/nonverbal cueing or touching/steadying/contact guard assistance fro
m one helper? Yes.



1. [id number] ADMISSION PERFORMANCE:



Supervision or touching assistance   



CODE:



04  



TOILETING HYGIENE:



TOILETING HYGIENE - STEP 1:



Does the patient complete the activity by him/herself with no assistance (physical, verbal/nonverbal 
cueing, setup/clean-up)? No.



TOILETING HYGIENE - STEP 2:



Does the patient need only setup/clean-up assistance from one helper? Yes.



1. [id number] ADMISSION PERFORMANCE:



Setup or clean-up assistance   



CODE:



05  



BATHING:



Not assessed/no information 



CODE:



-  



DRESSING - UPPER BODY:



Not assessed/no information 



CODE:



-  



DRESSING - LOWER BODY:



Not assessed/no information 



CODE:



-  



PUTTING ON/TAKING OFF FOOTWEAR:



Not assessed/no information 



CODE:



-  



ROLL LEFT AND RIGHT:



ROLL LEFT AND RIGHT - STEP 1:



Does the patient complete the activity by him/herself with no assistance (physical, verbal/nonverbal 
cueing, setup/clean-up)? No.



ROLL LEFT AND RIGHT - STEP 2:



Does the patient need only setup/clean-up assistance from one helper? No.



ROLL LEFT AND RIGHT - STEP 3:



Does the patient need only verbal/nonverbal cueing or touching/steadying/contact guard assistance fro
m one helper? Yes.



1. [id number] ADMISSION PERFORMANCE:



Supervision or touching assistance   



CODE:



04  



SIT TO LYING:



SIT TO LYING - STEP 1:



Does the patient complete the activity by him/herself with no assistance (physical, verbal/nonverbal 
cueing, setup/clean-up)? No.



SIT TO LYING - STEP 2:



Does the patient need only setup/clean-up assistance from one helper? No.



SIT TO LYING - STEP 3:



Does the patient need only verbal/nonverbal cueing or touching/steadying/contact guard assistance fro
m one helper? Yes.



1. [id number] ADMISSION PERFORMANCE:



Supervision or touching assistance   



CODE:



04  



LYING TO SITTING:



LYING TO SITTING ON SIDE OF BED - STEP 1:



Does the patient complete the activity by him/herself with no assistance (physical, verbal/nonverbal 
cueing, setup/clean-up)? No.



LYING TO SITTING ON SIDE OF BED - STEP 2:



Does the patient need only setup/clean-up assistance from one helper? No.



LYING TO SITTING ON SIDE OF BED - STEP 3:



Does the patient need only verbal/nonverbal cueing or touching/steadying/contact guard assistance fro
m one helper? Yes.



1. [id number] ADMISSION PERFORMANCE:



Supervision or touching assistance   



CODE:



04  



SIT TO STAND:



SIT TO STAND - STEP 1:



Does the patient complete the activity by him/herself with no assistance (physical, verbal/nonverbal 
cueing, setup/clean-up)? No.



SIT TO STAND - STEP 2:



Does the patient need only setup/clean-up assistance from one helper? No.



SIT TO STAND - STEP 3:



Does the patient need only verbal/nonverbal cueing or touching/steadying/contact guard assistance fro
m one helper? Yes.



1. [id number] ADMISSION PERFORMANCE:



Supervision or touching assistance   



CODE:



04  



TRANSFERS: BED, CHAIR:



CHAIR/BED-TO-CHAIR TRANSFER - STEP 1:



Does the patient complete the activity by him/herself with no assistance (physical, verbal/nonverbal 
cueing, setup/clean-up)? No.



CHAIR/BED-TO-CHAIR TRANSFER - STEP 2:



Does the patient need only setup/clean-up assistance from one helper? No.



CHAIR/BED-TO-CHAIR TRANSFER - STEP 3:



Does the patient need only verbal/nonverbal cueing or touching/steadying/contact guard assistance fro
m one helper? Yes.



1. [id number] ADMISSION PERFORMANCE:



Supervision or touching assistance   



CODE:



04  



TRANSFER TOILET:



TOILET TRANSFER - STEP 1:



Does the patient complete the activity by him/herself with no assistance (physical, verbal/nonverbal 
cueing, setup/clean-up)? No.



TOILET TRANSFER - STEP 2:



Does the patient need only setup/clean-up assistance from one helper? No.



TOILET TRANSFER - STEP 3:



Does the patient need only verbal/nonverbal cueing or touching/steadying/contact guard assistance fro
m one helper? Yes.



1. [id number] ADMISSION PERFORMANCE:



Supervision or touching assistance   



CODE:



04  



TRANSFERS: CAR:



Not assessed/no information 



CODE:



-  



WALK 10 FEET:



Not assessed/no information 



CODE:



-   



1 STEP (CURB):



Not assessed/no information 



CODE:



-   



PICKING UP OBJECT:



Not assessed/no information 



CODE:



-  



DOES THE PATIENT USE A WHEELCHAIR/SCOOTER?



CODE:



EXPR  



WHEEL 50 FEET WITH TWO TURNS:



Not assessed/no information 



CODE:



-  



INDICATE THE TYPE OF WHEELCHAIR/SCOOTER USED:



CODE:



EXPR  



WHEEL 150 FEET:



Not assessed/no information 



CODE:



-  



INDICATE THE TYPE OF WHEELCHAIR/SCOOTER USED:



CODE:



EXPR  



BLADDER AND BOWEL:



H350. BLADDER CONTINENCE (3-DAY ASSESSMENT PERIOD):



Always continent (no documented incontinence)   



CODE:



0  



H400. BOWEL CONTINENCE (3-DAY ASSESSMENT PERIOD):



Always continent   



CODE:



0  



SIGNATURE PANEL:



The following modified sections: 1. [id number] Admission Performance, 1. [id number] Admission Performance, 
1. [id number] Admission Performance, 1. [id number] Admission Performance, 1. [id number] Admission Performance,
 1. [id number] Admission Performance, 1. [id number] Admission Performance, 1. [id number] Admission Performance
, 1. [id number] Admission Performance, Code, H350. Bladder Continence (3-day assessment period), H400. B
owel Continence (3-day assessment period) were [electronically] signed by Zelalem King on Mon Dec 23 20
19 10:04:58 GMT-0600 (Central Standard Time)

## 2019-12-24 VITALS — TEMPERATURE: 97.4 F | SYSTOLIC BLOOD PRESSURE: 114 MMHG | DIASTOLIC BLOOD PRESSURE: 62 MMHG

## 2019-12-24 RX ADMIN — FUROSEMIDE SCH MG: 20 TABLET ORAL at 12:12

## 2019-12-24 RX ADMIN — LACTULOSE SCH GM: 20 SOLUTION ORAL at 08:00

## 2019-12-24 RX ADMIN — GABAPENTIN SCH MG: 300 CAPSULE ORAL at 09:00

## 2019-12-24 RX ADMIN — FOLIC ACID-PYRIDOXINE-CYANOCOBALAMIN TAB 2.5-25-2 MG SCH TAB: 2.5-25-2 TAB at 09:00

## 2019-12-24 RX ADMIN — MAGNESIUM OXIDE TAB 400 MG (241.3 MG ELEMENTAL MG) SCH MG: 400 (241.3 MG) TAB at 09:00

## 2019-12-24 RX ADMIN — OXYCODONE HYDROCHLORIDE PRN MG: 5 TABLET ORAL at 02:43

## 2019-12-24 RX ADMIN — PANTOPRAZOLE SODIUM SCH MG: 40 TABLET, DELAYED RELEASE ORAL at 12:12

## 2019-12-24 RX ADMIN — ONDANSETRON PRN MG: 4 TABLET, ORALLY DISINTEGRATING ORAL at 09:00

## 2019-12-24 RX ADMIN — SPIRONOLACTONE SCH MG: 25 TABLET, FILM COATED ORAL at 12:10

## 2019-12-24 RX ADMIN — OXYCODONE HYDROCHLORIDE PRN MG: 5 TABLET ORAL at 12:38

## 2019-12-24 RX ADMIN — OXYCODONE HYDROCHLORIDE SCH MG: 20 TABLET, FILM COATED, EXTENDED RELEASE ORAL at 08:30

## 2019-12-24 RX ADMIN — ONDANSETRON PRN MG: 4 TABLET, ORALLY DISINTEGRATING ORAL at 12:39

## 2019-12-24 RX ADMIN — IRON SUPPLEMENT SCH MG: 325 TABLET ORAL at 09:00

## 2019-12-24 RX ADMIN — Medication SCH: at 08:00

## 2019-12-24 RX ADMIN — Medication SCH TAB: at 09:00
